# Patient Record
Sex: FEMALE | Race: WHITE | NOT HISPANIC OR LATINO | Employment: FULL TIME | URBAN - NONMETROPOLITAN AREA
[De-identification: names, ages, dates, MRNs, and addresses within clinical notes are randomized per-mention and may not be internally consistent; named-entity substitution may affect disease eponyms.]

---

## 2017-01-04 DIAGNOSIS — Z00.00 ROUTINE GENERAL MEDICAL EXAMINATION AT A HEALTH CARE FACILITY: Primary | ICD-10-CM

## 2017-01-09 LAB
ALBUMIN SERPL-MCNC: 4.3 GM/DL (ref 3.2–5.5)
ALP SERPL-CCNC: 68 U/L (ref 15–121)
ALT SERPL-CCNC: 142 U/L (ref 10–60)
ANION GAP SERPL CALCULATED.3IONS-SCNC: 10 MMOL/L (ref 5–15)
AST SERPL-CCNC: 113 U/L (ref 10–60)
BASOPHILS NFR BLD AUTO: 0.5 % (ref 0–2)
BILIRUB SERPL-MCNC: 1 MG/DL (ref 0.2–1)
BUN SERPL-MCNC: 13 MG/DL (ref 8–25)
CALCIUM SERPL-MCNC: 9.5 MG/DL (ref 8.4–10.8)
CHLORIDE SERPL-SCNC: 104 MMOL/L (ref 100–112)
CO2 SERPL-SCNC: 26 MMOL/L (ref 20–32)
CREAT SERPL-MCNC: 1 MG/DL (ref 0.4–1.3)
EOSINOPHIL NFR BLD AUTO: 1.8 % (ref 0–7)
ERYTHROCYTE [DISTWIDTH] IN BLOOD: 14.4 % (ref 11.5–14.5)
GLUCOSE SERPL-MCNC: 135 MG/DL (ref 70–100)
GRANULOCYTES NFR BLD AUTO: 56.4 % (ref 37–80)
HCT VFR BLD CALC: 38.6 % (ref 35–45)
HGB BLD-MCNC: 12.8 GM/DL (ref 12–15.5)
LYMPHOCYTES NFR BLD AUTO: 33.4 % (ref 10–50)
MCH RBC QN: 27.5 PG (ref 26–34)
MCHC RBC-ENTMCNC: 33.2 GM/DL (ref 31.4–36)
MCV RBC: 82.8 FL (ref 80–98)
MONOCYTES NFR BLD AUTO: 7.9 % (ref 0–12)
NRBC BLD AUTO-RTO: 0 %
NRBC SPEC MANUAL: 0
PLATELET # BLD: 322 X1000/MM3 (ref 150–450)
PMV BLD: 10.3 FL (ref 8–12)
POTASSIUM SERPL-SCNC: 3.9 MMOL/L (ref 3.4–5.4)
PROT SERPL-MCNC: 8 GM/DL (ref 6.7–8.2)
RBC # BLD: 4.66 MEGA/MM3 (ref 3.77–5.16)
SODIUM SERPL-SCNC: 140 MMOL/L (ref 134–146)
WBC # BLD: 8 X1000/UL (ref 3.2–9.8)

## 2017-01-10 LAB
T3FREE SERPL-MCNC: 3.1 PG/ML (ref 2–4.4)
T4 SERPL-MCNC: 14.5 UG/DL (ref 5.5–11)
TSH SERPL-ACNC: 2.52 UIU/ML (ref 0.46–4.68)

## 2017-01-11 DIAGNOSIS — R74.8 ELEVATED LIVER ENZYMES: Primary | ICD-10-CM

## 2017-09-25 ENCOUNTER — LAB (OUTPATIENT)
Dept: LAB | Facility: OTHER | Age: 22
End: 2017-09-25

## 2017-09-25 DIAGNOSIS — Z00.00 ROUTINE GENERAL MEDICAL EXAMINATION AT A HEALTH CARE FACILITY: Primary | ICD-10-CM

## 2017-09-25 DIAGNOSIS — Z00.00 ROUTINE GENERAL MEDICAL EXAMINATION AT A HEALTH CARE FACILITY: ICD-10-CM

## 2017-09-25 LAB
ALBUMIN SERPL-MCNC: 4.3 G/DL (ref 3.2–5.5)
ALBUMIN/GLOB SERPL: 1.3 G/DL (ref 1–3)
ALP SERPL-CCNC: 58 U/L (ref 15–121)
ALT SERPL W P-5'-P-CCNC: 55 U/L (ref 10–60)
ANION GAP SERPL CALCULATED.3IONS-SCNC: 10 MMOL/L (ref 5–15)
AST SERPL-CCNC: 42 U/L (ref 10–60)
BASOPHILS # BLD AUTO: 0.04 10*3/MM3 (ref 0–0.2)
BASOPHILS NFR BLD AUTO: 0.4 % (ref 0–2)
BILIRUB SERPL-MCNC: 0.5 MG/DL (ref 0.2–1)
BUN BLD-MCNC: 13 MG/DL (ref 8–25)
BUN/CREAT SERPL: 11.8 (ref 7–25)
CALCIUM SPEC-SCNC: 9.3 MG/DL (ref 8.4–10.8)
CHLORIDE SERPL-SCNC: 104 MMOL/L (ref 100–112)
CHOLEST SERPL-MCNC: 186 MG/DL (ref 150–200)
CO2 SERPL-SCNC: 27 MMOL/L (ref 20–32)
CREAT BLD-MCNC: 1.1 MG/DL (ref 0.4–1.3)
DEPRECATED RDW RBC AUTO: 41.9 FL (ref 36.4–46.3)
EOSINOPHIL # BLD AUTO: 0.18 10*3/MM3 (ref 0–0.7)
EOSINOPHIL NFR BLD AUTO: 1.8 % (ref 0–7)
ERYTHROCYTE [DISTWIDTH] IN BLOOD BY AUTOMATED COUNT: 13.9 % (ref 11.5–14.5)
GFR SERPL CREATININE-BSD FRML MDRD: 63 ML/MIN/1.73 (ref 71–165)
GLOBULIN UR ELPH-MCNC: 3.3 GM/DL (ref 2.5–4.6)
GLUCOSE BLD-MCNC: 105 MG/DL (ref 70–100)
HCT VFR BLD AUTO: 38.8 % (ref 35–45)
HDLC SERPL-MCNC: 27 MG/DL (ref 35–100)
HGB BLD-MCNC: 12.6 G/DL (ref 12–15.5)
LDLC SERPL CALC-MCNC: 96 MG/DL
LDLC/HDLC SERPL: 3.56 {RATIO}
LYMPHOCYTES # BLD AUTO: 3.88 10*3/MM3 (ref 0.6–4.2)
LYMPHOCYTES NFR BLD AUTO: 38.6 % (ref 10–50)
MCH RBC QN AUTO: 27.5 PG (ref 26.5–34)
MCHC RBC AUTO-ENTMCNC: 32.5 G/DL (ref 31.4–36)
MCV RBC AUTO: 84.5 FL (ref 80–98)
MONOCYTES # BLD AUTO: 0.99 10*3/MM3 (ref 0–0.9)
MONOCYTES NFR BLD AUTO: 9.9 % (ref 0–12)
NEUTROPHILS # BLD AUTO: 4.95 10*3/MM3 (ref 2–8.6)
NEUTROPHILS NFR BLD AUTO: 49.3 % (ref 37–80)
PLATELET # BLD AUTO: 344 10*3/MM3 (ref 150–450)
PMV BLD AUTO: 11.3 FL (ref 8–12)
POTASSIUM BLD-SCNC: 3.9 MMOL/L (ref 3.4–5.4)
PROT SERPL-MCNC: 7.6 G/DL (ref 6.7–8.2)
RBC # BLD AUTO: 4.59 10*6/MM3 (ref 3.77–5.16)
SODIUM BLD-SCNC: 141 MMOL/L (ref 134–146)
T4 FREE SERPL-MCNC: 1.3 NG/DL (ref 0.78–2.19)
TRIGL SERPL-MCNC: 314 MG/DL (ref 35–160)
TSH SERPL DL<=0.05 MIU/L-ACNC: 3.99 MIU/ML (ref 0.46–4.68)
VLDLC SERPL-MCNC: 62.8 MG/DL
WBC NRBC COR # BLD: 10.04 10*3/MM3 (ref 3.2–9.8)

## 2017-09-25 PROCEDURE — 84439 ASSAY OF FREE THYROXINE: CPT | Performed by: NURSE PRACTITIONER

## 2017-09-25 PROCEDURE — 84443 ASSAY THYROID STIM HORMONE: CPT | Performed by: NURSE PRACTITIONER

## 2017-09-25 PROCEDURE — 80061 LIPID PANEL: CPT | Performed by: NURSE PRACTITIONER

## 2017-09-25 PROCEDURE — 84481 FREE ASSAY (FT-3): CPT | Performed by: NURSE PRACTITIONER

## 2017-09-25 PROCEDURE — 36415 COLL VENOUS BLD VENIPUNCTURE: CPT | Performed by: NURSE PRACTITIONER

## 2017-09-25 PROCEDURE — 85025 COMPLETE CBC W/AUTO DIFF WBC: CPT | Performed by: NURSE PRACTITIONER

## 2017-09-25 PROCEDURE — 80053 COMPREHEN METABOLIC PANEL: CPT | Performed by: NURSE PRACTITIONER

## 2017-09-26 DIAGNOSIS — E78.2 ELEVATED TRIGLYCERIDES WITH HIGH CHOLESTEROL: Primary | ICD-10-CM

## 2017-09-26 LAB — T3FREE SERPL-MCNC: 3.1 PG/ML (ref 2–4.4)

## 2018-12-20 ENCOUNTER — LAB REQUISITION (OUTPATIENT)
Dept: LAB | Facility: OTHER | Age: 23
End: 2018-12-20

## 2018-12-20 DIAGNOSIS — Z00.00 ROUTINE GENERAL MEDICAL EXAMINATION AT A HEALTH CARE FACILITY: ICD-10-CM

## 2018-12-20 PROCEDURE — UDS COCC - COLLECTION FEE ONLY: Performed by: INTERNAL MEDICINE

## 2019-04-08 ENCOUNTER — OFFICE VISIT (OUTPATIENT)
Dept: OBSTETRICS AND GYNECOLOGY | Facility: CLINIC | Age: 24
End: 2019-04-08

## 2019-04-08 VITALS
DIASTOLIC BLOOD PRESSURE: 90 MMHG | HEIGHT: 65 IN | BODY MASS INDEX: 40.55 KG/M2 | HEART RATE: 89 BPM | WEIGHT: 243.4 LBS | SYSTOLIC BLOOD PRESSURE: 138 MMHG

## 2019-04-08 DIAGNOSIS — Z01.419 ENCOUNTER FOR GYNECOLOGICAL EXAMINATION WITH PAPANICOLAOU SMEAR OF CERVIX: Primary | ICD-10-CM

## 2019-04-08 DIAGNOSIS — N92.6 MENSTRUAL PERIODS IRREGULAR: ICD-10-CM

## 2019-04-08 DIAGNOSIS — L83 ACANTHOSIS NIGRICANS: ICD-10-CM

## 2019-04-08 DIAGNOSIS — Z01.89 ROUTINE LAB DRAW: ICD-10-CM

## 2019-04-08 LAB
ALBUMIN SERPL-MCNC: 4.3 G/DL (ref 3.5–5)
ALBUMIN/GLOB SERPL: 1.4 G/DL (ref 1.1–1.8)
ALP SERPL-CCNC: 76 U/L (ref 38–126)
ALT SERPL W P-5'-P-CCNC: 36 U/L
ANION GAP SERPL CALCULATED.3IONS-SCNC: 10 MMOL/L (ref 5–15)
AST SERPL-CCNC: 34 U/L (ref 14–36)
BASOPHILS # BLD AUTO: 0.05 10*3/MM3 (ref 0–0.2)
BASOPHILS NFR BLD AUTO: 0.6 % (ref 0–1.5)
BILIRUB SERPL-MCNC: 0.3 MG/DL (ref 0.2–1.3)
BUN BLD-MCNC: 9 MG/DL (ref 7–23)
BUN/CREAT SERPL: 9.4 (ref 7–25)
CALCIUM SPEC-SCNC: 8.8 MG/DL (ref 8.4–10.2)
CHLORIDE SERPL-SCNC: 107 MMOL/L (ref 101–112)
CHOLEST SERPL-MCNC: 164 MG/DL (ref 150–200)
CO2 SERPL-SCNC: 24 MMOL/L (ref 22–30)
CREAT BLD-MCNC: 0.96 MG/DL (ref 0.52–1.04)
DEPRECATED RDW RBC AUTO: 46.4 FL (ref 37–54)
EOSINOPHIL # BLD AUTO: 0.17 10*3/MM3 (ref 0–0.4)
EOSINOPHIL NFR BLD AUTO: 2.1 % (ref 0.3–6.2)
ERYTHROCYTE [DISTWIDTH] IN BLOOD BY AUTOMATED COUNT: 17.7 % (ref 12.3–15.4)
GFR SERPL CREATININE-BSD FRML MDRD: 72 ML/MIN/1.73 (ref 71–165)
GLOBULIN UR ELPH-MCNC: 3 GM/DL (ref 2.3–3.5)
GLUCOSE BLD-MCNC: 115 MG/DL (ref 70–99)
HCT VFR BLD AUTO: 34 % (ref 34–46.6)
HDLC SERPL-MCNC: 32 MG/DL (ref 40–59)
HGB BLD-MCNC: 10.6 G/DL (ref 12–15.9)
LDLC SERPL CALC-MCNC: 83 MG/DL
LDLC/HDLC SERPL: 2.59 {RATIO} (ref 0–3.22)
LYMPHOCYTES # BLD AUTO: 2.36 10*3/MM3 (ref 0.7–3.1)
LYMPHOCYTES NFR BLD AUTO: 29.8 % (ref 19.6–45.3)
MCH RBC QN AUTO: 23.5 PG (ref 26.6–33)
MCHC RBC AUTO-ENTMCNC: 31.2 G/DL (ref 31.5–35.7)
MCV RBC AUTO: 75.2 FL (ref 79–97)
MONOCYTES # BLD AUTO: 0.87 10*3/MM3 (ref 0.1–0.9)
MONOCYTES NFR BLD AUTO: 11 % (ref 5–12)
NEUTROPHILS # BLD AUTO: 4.46 10*3/MM3 (ref 1.4–7)
NEUTROPHILS NFR BLD AUTO: 56.5 % (ref 42.7–76)
PLATELET # BLD AUTO: 391 10*3/MM3 (ref 140–450)
PMV BLD AUTO: 10.3 FL (ref 6–12)
POTASSIUM BLD-SCNC: 4 MMOL/L (ref 3.4–5)
PROT SERPL-MCNC: 7.3 G/DL (ref 6.3–8.6)
RBC # BLD AUTO: 4.52 10*6/MM3 (ref 3.77–5.28)
SODIUM BLD-SCNC: 141 MMOL/L (ref 137–145)
TRIGL SERPL-MCNC: 246 MG/DL
TSH SERPL DL<=0.05 MIU/L-ACNC: 3.06 MIU/ML (ref 0.27–4.2)
VLDLC SERPL-MCNC: 49.2 MG/DL
WBC NRBC COR # BLD: 7.91 10*3/MM3 (ref 3.4–10.8)

## 2019-04-08 PROCEDURE — 36415 COLL VENOUS BLD VENIPUNCTURE: CPT | Performed by: NURSE PRACTITIONER

## 2019-04-08 PROCEDURE — 80053 COMPREHEN METABOLIC PANEL: CPT | Performed by: NURSE PRACTITIONER

## 2019-04-08 PROCEDURE — 88142 CYTOPATH C/V THIN LAYER: CPT | Performed by: NURSE PRACTITIONER

## 2019-04-08 PROCEDURE — 84443 ASSAY THYROID STIM HORMONE: CPT | Performed by: NURSE PRACTITIONER

## 2019-04-08 PROCEDURE — 88141 CYTOPATH C/V INTERPRET: CPT | Performed by: PATHOLOGY

## 2019-04-08 PROCEDURE — 85025 COMPLETE CBC W/AUTO DIFF WBC: CPT | Performed by: NURSE PRACTITIONER

## 2019-04-08 PROCEDURE — 83525 ASSAY OF INSULIN: CPT | Performed by: NURSE PRACTITIONER

## 2019-04-08 PROCEDURE — 80061 LIPID PANEL: CPT | Performed by: NURSE PRACTITIONER

## 2019-04-08 PROCEDURE — 99395 PREV VISIT EST AGE 18-39: CPT | Performed by: NURSE PRACTITIONER

## 2019-04-08 NOTE — PROGRESS NOTES
Subjective   Chief Complaint   Patient presents with   • Gynecologic Exam     well woman annual visit     Gayatri Mishra is a 23 y.o. year old  presenting to be seen for her annual exam.  Today she has questions about irregular periods. She states since menarche at 9-11yo, her periods have been irregular. At times skipping several months. Currently periods vary from 3 days -3weeks in length. Mostly heavy but on the longer periods it varies. No cramping. Thyroid was WNL in 2017. Hx of slightly elevated glucose and high cholesterol. Never had an exam. Did take OCPs when she was younger that helped regulate her periods some. Is not using any form of contraception but not actively trying to conceive either.      Patient's last menstrual period was 2019 (exact date).    OB History      Para Term  AB Living    0 0 0 0 0 0    SAB TAB Ectopic Molar Multiple Live Births    0 0 0 0 0 0          Current birth control method: none.    She is sexually active.  In the past 12 months there has not been new sexual partners.  Condoms are not typically used.  She would not like to be screened for STD's at today's exam. Getting  in 2019.     Past 6 month menstrual history:    Cycle Frequency: unpredictable  irregular   Menstrual cycle character: flow is typically moderately heavy and flow is typically excessive   Cycle Duration: 3 days - 3 weeks   Number of heavy days of flows: Most days heavy   Dysmenorrhea: none   PMS: is not affecting her activities of daily living   Intermenstrual bleeding present: no   Post-coital bleeding present: no     She exercises regularly: no.  She wears her seat belt:yes.  She has concerns about domestic violence: no.  Last colonoscopy: Never  Last DEXA: Never  Last MMG: Never  Last pap: Never  History of abnormal PAP: N/A    The following portions of the patient's history were reviewed and updated as appropriate:problem list, current medications,  "allergies, past family history, past medical history, past social history and past surgical history.    Social History    Tobacco Use      Smoking status: Never Smoker      Smokeless tobacco: Never Used    Social History     Substance and Sexual Activity   Alcohol Use Yes    Comment: occasional      Review of Systems   Constitutional: Negative.  Negative for chills and fever. Unexpected weight change: difficulty losing weight.   Respiratory: Negative.    Cardiovascular: Negative.    Gastrointestinal: Negative.  Negative for abdominal pain, constipation and diarrhea.   Genitourinary: Positive for menstrual problem. Negative for dyspareunia, dysuria, pelvic pain, vaginal bleeding, vaginal discharge and vaginal pain.   Skin:        Darkening skin folds, no acne or hirsutism   Neurological: Negative for syncope, light-headedness and headaches.   Psychiatric/Behavioral: Negative.          Objective   /90   Pulse 89   Ht 165.1 cm (65\")   Wt 110 kg (243 lb 6.4 oz)   LMP 03/16/2019 (Exact Date)   Breastfeeding? No   BMI 40.50 kg/m²     General:  well developed; well nourished  no acute distress  obese - Body mass index is 40.5 kg/m².   Skin:  No suspicious lesions seen  acanthosis nigricans around the base of the neck, axillas bilaterally, and in the groin bilaterally   Thyroid: normal to inspection and palpation   Breasts:  Examined in supine position  Symmetric without masses or skin dimpling  Nipples normal without inversion, lesions or discharge  There are no palpable axillary nodes   Abdomen: soft, non-tender; no masses  no umbilical or inguinal hernias are present  no hepato-splenomegaly  Normal findings: bowel sounds normal   Cardiac: Heart sounds are normal.  Regular rate and rhythm without murmur, gallop or rub.   Resp: Normal expansion.  Clear to auscultation.  No rales, rhonchi, or wheezing.   Psych: alert,oriented, in NAD with a full range of affect, normal behavior and no psychotic features "   Pelvis: Uterus:  Exam limited by  body habitus  Clinical staff was present for exam  External genitalia:  normal appearance of the external genitalia including Bartholin's and Harwick's glands.  :  urethral meatus normal;  Vaginal:  normal pink mucosa without prolapse or lesions  Cervix:  normal appearance.  Uterus:  normal size, shape and consistency  Adnexa:  non palpable bilaterally.     Lab Review   CBC, CMP, TSH and lipid from 2017    Imaging  No data reviewed       Gayatri was seen today for gynecologic exam.    Diagnoses and all orders for this visit:    Encounter for gynecological examination with Papanicolaou smear of cervix  -     Liquid-based Pap Smear, Screening  Pap results: I will send card in mail or call if abnormal. RTC annually for well woman exams    Menstrual periods irregular    Acanthosis nigricans  -     Insulin, Total    Routine lab draw  -     CBC Auto Differential  -     Comprehensive Metabolic Panel  -     TSH  -     Lipid Panel    I discussed irregular periods at length. Pt is not very bothered by them but would like to further evaluate for other health concerns like diabetes, hyperlipidemia, etc. I will order the labs listed above and call pt with results. Refer to PCP if needed, consider metformin if insulin is elevated. Consider hormonal regimens to regulate menses. Pt to think on this for now.         This note was electronically signed.    Lynda Garcia, APRN  April 8, 2019

## 2019-04-09 LAB — INSULIN SERPL-ACNC: 83.7 UIU/ML (ref 2.6–24.9)

## 2019-04-10 LAB
GEN CATEG CVX/VAG CYTO-IMP: NORMAL
LAB AP CASE REPORT: NORMAL
LAB AP GYN ADDITIONAL INFORMATION: NORMAL
LAB AP GYN OTHER FINDINGS: NORMAL
PATH INTERP SPEC-IMP: NORMAL
STAT OF ADQ CVX/VAG CYTO-IMP: NORMAL

## 2019-04-12 RX ORDER — METFORMIN HYDROCHLORIDE 500 MG/1
1000 TABLET, EXTENDED RELEASE ORAL
Qty: 60 TABLET | Refills: 5 | Status: SHIPPED | OUTPATIENT
Start: 2019-04-12 | End: 2021-02-19

## 2019-04-12 NOTE — PROGRESS NOTES
Glucose 115, Insulin 83.7. Begin Metformin. GI side effects discussed. Pt to RTC in 6 months for follow up and repeat labs. Diet and exercise encouraged. Consider seeing PCP for further evaluation of lipids.

## 2019-07-31 ENCOUNTER — OFFICE VISIT (OUTPATIENT)
Dept: OBSTETRICS AND GYNECOLOGY | Facility: CLINIC | Age: 24
End: 2019-07-31

## 2019-07-31 VITALS
SYSTOLIC BLOOD PRESSURE: 140 MMHG | BODY MASS INDEX: 41.82 KG/M2 | HEIGHT: 65 IN | WEIGHT: 251 LBS | HEART RATE: 96 BPM | DIASTOLIC BLOOD PRESSURE: 88 MMHG

## 2019-07-31 DIAGNOSIS — N92.1 EXCESSIVE AND FREQUENT MENSTRUATION WITH IRREGULAR CYCLE: Primary | ICD-10-CM

## 2019-07-31 LAB
BASOPHILS # BLD AUTO: 0.05 10*3/MM3 (ref 0–0.2)
BASOPHILS NFR BLD AUTO: 0.5 % (ref 0–1.5)
DEPRECATED RDW RBC AUTO: 48.2 FL (ref 37–54)
EOSINOPHIL # BLD AUTO: 0.15 10*3/MM3 (ref 0–0.4)
EOSINOPHIL NFR BLD AUTO: 1.4 % (ref 0.3–6.2)
ERYTHROCYTE [DISTWIDTH] IN BLOOD BY AUTOMATED COUNT: 17.8 % (ref 12.3–15.4)
HCT VFR BLD AUTO: 30 % (ref 34–46.6)
HGB BLD-MCNC: 9.4 G/DL (ref 12–15.9)
LYMPHOCYTES # BLD AUTO: 3.23 10*3/MM3 (ref 0.7–3.1)
LYMPHOCYTES NFR BLD AUTO: 30.3 % (ref 19.6–45.3)
MCH RBC QN AUTO: 24.3 PG (ref 26.6–33)
MCHC RBC AUTO-ENTMCNC: 31.3 G/DL (ref 31.5–35.7)
MCV RBC AUTO: 77.5 FL (ref 79–97)
MONOCYTES # BLD AUTO: 1.17 10*3/MM3 (ref 0.1–0.9)
MONOCYTES NFR BLD AUTO: 11 % (ref 5–12)
NEUTROPHILS # BLD AUTO: 6.07 10*3/MM3 (ref 1.7–7)
NEUTROPHILS NFR BLD AUTO: 56.8 % (ref 42.7–76)
PLATELET # BLD AUTO: 417 10*3/MM3 (ref 140–450)
PMV BLD AUTO: 9.9 FL (ref 6–12)
RBC # BLD AUTO: 3.87 10*6/MM3 (ref 3.77–5.28)
WBC NRBC COR # BLD: 10.67 10*3/MM3 (ref 3.4–10.8)

## 2019-07-31 PROCEDURE — 36415 COLL VENOUS BLD VENIPUNCTURE: CPT | Performed by: NURSE PRACTITIONER

## 2019-07-31 PROCEDURE — 99214 OFFICE O/P EST MOD 30 MIN: CPT | Performed by: NURSE PRACTITIONER

## 2019-07-31 PROCEDURE — 85025 COMPLETE CBC W/AUTO DIFF WBC: CPT | Performed by: NURSE PRACTITIONER

## 2019-08-02 NOTE — PROGRESS NOTES
Subjective   Gayatri Mishra is a 23 y.o. female.     History of Present Illness   Pt presents with concerns of a prolonged period. When I saw pt in April she mentioned her irregular periods that could last up to 3 weeks but didn't want to take anything for them. She was trying to conceive but isn't preventing either. Pt states in the last 3 months she has only stopped bleeding for approximately 7 days at a time. This current bleeding has been going on for 3 weeks. In the last few days, having to change a pad every hour. She is already taking 325mg Iron tablets once daily and metformin as prescribed without any GI upset. Pt denies fatigue, dizziness, lightheadedness, palpitations or feeling cold.     Pt gets  on August 24th and really wants bleeding under control by then. She is willing to do OCPs for now to get her through the wedding.     The following portions of the patient's history were reviewed and updated as appropriate: allergies, current medications, past family history, past medical history, past social history, past surgical history and problem list.    Review of Systems   Constitutional: Negative.  Negative for chills, fatigue and fever.   Respiratory: Negative.  Negative for shortness of breath.    Cardiovascular: Negative.  Negative for palpitations.   Gastrointestinal: Negative for abdominal pain, constipation, diarrhea, nausea and vomiting.   Endocrine: Negative for cold intolerance.   Genitourinary: Positive for menstrual problem (excessive bleeding, prolonged). Negative for pelvic pain.   Skin: Negative for pallor.   Neurological: Negative for dizziness, syncope, weakness and light-headedness.       Objective    Vitals:    07/31/19 1328   BP: 140/88   Pulse: 96         07/31/19  1328   Weight: 114 kg (251 lb)     Body mass index is 41.77 kg/m².    Physical Exam   Constitutional: She is oriented to person, place, and time. She appears well-developed and well-nourished. She is  morbidly obese.  Cardiovascular: Normal rate, regular rhythm and normal heart sounds.   Pulmonary/Chest: Effort normal and breath sounds normal.   Neurological: She is alert and oriented to person, place, and time.   Skin: Skin is warm and dry. No pallor.   Vitals reviewed.    WBC   Date Value Ref Range Status   07/31/2019 10.67 3.40 - 10.80 10*3/mm3 Final     RBC   Date Value Ref Range Status   07/31/2019 3.87 3.77 - 5.28 10*6/mm3 Final     Hemoglobin   Date Value Ref Range Status   07/31/2019 9.4 (L) 12.0 - 15.9 g/dL Final     Hematocrit   Date Value Ref Range Status   07/31/2019 30.0 (L) 34.0 - 46.6 % Final     MCV   Date Value Ref Range Status   07/31/2019 77.5 (L) 79.0 - 97.0 fL Final     MCH   Date Value Ref Range Status   07/31/2019 24.3 (L) 26.6 - 33.0 pg Final     MCHC   Date Value Ref Range Status   07/31/2019 31.3 (L) 31.5 - 35.7 g/dL Final     RDW   Date Value Ref Range Status   07/31/2019 17.8 (H) 12.3 - 15.4 % Final     RDW-SD   Date Value Ref Range Status   07/31/2019 48.2 37.0 - 54.0 fl Final     MPV   Date Value Ref Range Status   07/31/2019 9.9 6.0 - 12.0 fL Final     Platelets   Date Value Ref Range Status   07/31/2019 417 140 - 450 10*3/mm3 Final     Neutrophil %   Date Value Ref Range Status   07/31/2019 56.8 42.7 - 76.0 % Final     Lymphocyte %   Date Value Ref Range Status   07/31/2019 30.3 19.6 - 45.3 % Final     Monocyte %   Date Value Ref Range Status   07/31/2019 11.0 5.0 - 12.0 % Final     Eosinophil %   Date Value Ref Range Status   07/31/2019 1.4 0.3 - 6.2 % Final     Basophil %   Date Value Ref Range Status   07/31/2019 0.5 0.0 - 1.5 % Final     Immature Granulocyte Rel %   Date Value Ref Range Status   08/12/2015 0.40 0.00 - 0.50 % Final     Neutrophils, Absolute   Date Value Ref Range Status   07/31/2019 6.07 1.70 - 7.00 10*3/mm3 Final     Lymphocytes, Absolute   Date Value Ref Range Status   07/31/2019 3.23 (H) 0.70 - 3.10 10*3/mm3 Final     Monocytes, Absolute   Date Value Ref  Range Status   07/31/2019 1.17 (H) 0.10 - 0.90 10*3/mm3 Final     Eosinophils, Absolute   Date Value Ref Range Status   07/31/2019 0.15 0.00 - 0.40 10*3/mm3 Final     Basophils, Absolute   Date Value Ref Range Status   07/31/2019 0.05 0.00 - 0.20 10*3/mm3 Final     Immature Granulocytes Absolute   Date Value Ref Range Status   08/12/2015 0.030 (H) 0.005 - 0.022 x1000/uL Final     nRBC   Date Value Ref Range Status   01/09/2017 0  Final   01/09/2017 0  Final         Assessment/Plan   Gayatri was seen today for menometrorrhagia.    Diagnoses and all orders for this visit:    Excessive and frequent menstruation with irregular cycle  -     norethindrone-ethinyl estradiol (NORTREL 1/35, 21,) 1-35 MG-MCG per tablet; Take 1 tablet by mouth Daily.  -     CBC Auto Differential      CBC indicated anemia secondary to excessive blood loss during menstruation. Instructed pt to take 65mg elemental iron BID. Warned of GI side effects. Start Nortrel 1/35. Take 2 tablets PO x 4 days. Then reduce to once daily if bleeding has improved. Take one tablet daily to complete the pack, take a 4 day holiday and resume the next pack. This should have pt back on hormone pills 3-4 days before her wedding, in hopes of preventing bleeding. I will call pt on Friday to check in to make sure bleeding is slowing down. Warned of S/S of excessive blood loss, if saturating a pad an hour for several hours in a row and feeling fatigued, dizzy, syncope, etc, go to ER. Pt voices understanding. Otherwise RTC after the wedding to discuss long term management.

## 2019-09-17 ENCOUNTER — LAB REQUISITION (OUTPATIENT)
Dept: LAB | Facility: OTHER | Age: 24
End: 2019-09-17

## 2019-09-17 DIAGNOSIS — Z00.00 ROUTINE GENERAL MEDICAL EXAMINATION AT A HEALTH CARE FACILITY: ICD-10-CM

## 2019-09-17 LAB — Lab: 1

## 2019-09-17 PROCEDURE — 82075 ASSAY OF BREATH ETHANOL: CPT | Performed by: INTERNAL MEDICINE

## 2019-09-17 PROCEDURE — UDS COCC - COLLECTION FEE ONLY: Performed by: INTERNAL MEDICINE

## 2019-10-02 ENCOUNTER — LAB (OUTPATIENT)
Dept: LAB | Facility: OTHER | Age: 24
End: 2019-10-02

## 2019-10-02 DIAGNOSIS — N92.1 BREAKTHROUGH BLEEDING ON BIRTH CONTROL PILLS: ICD-10-CM

## 2019-10-02 DIAGNOSIS — D50.0 IRON DEFICIENCY ANEMIA DUE TO CHRONIC BLOOD LOSS: ICD-10-CM

## 2019-10-02 DIAGNOSIS — N92.1 BREAKTHROUGH BLEEDING ON BIRTH CONTROL PILLS: Primary | ICD-10-CM

## 2019-10-02 LAB
ANISOCYTOSIS BLD QL: ABNORMAL
BASOPHILS # BLD MANUAL: 0.19 10*3/MM3 (ref 0–0.2)
BASOPHILS NFR BLD AUTO: 2 % (ref 0–1.5)
DEPRECATED RDW RBC AUTO: 44.8 FL (ref 37–54)
ELLIPTOCYTES BLD QL SMEAR: ABNORMAL
EOSINOPHIL # BLD MANUAL: 0.29 10*3/MM3 (ref 0–0.4)
EOSINOPHIL NFR BLD MANUAL: 3 % (ref 0.3–6.2)
ERYTHROCYTE [DISTWIDTH] IN BLOOD BY AUTOMATED COUNT: 18.4 % (ref 12.3–15.4)
HCT VFR BLD AUTO: 24.7 % (ref 34–46.6)
HGB BLD-MCNC: 7.1 G/DL (ref 12–15.9)
HYPOCHROMIA BLD QL: ABNORMAL
LARGE PLATELETS: ABNORMAL
LYMPHOCYTES # BLD MANUAL: 3.52 10*3/MM3 (ref 0.7–3.1)
LYMPHOCYTES NFR BLD MANUAL: 37 % (ref 19.6–45.3)
LYMPHOCYTES NFR BLD MANUAL: 4 % (ref 5–12)
MCH RBC QN AUTO: 19.8 PG (ref 26.6–33)
MCHC RBC AUTO-ENTMCNC: 28.7 G/DL (ref 31.5–35.7)
MCV RBC AUTO: 68.8 FL (ref 79–97)
MICROCYTES BLD QL: ABNORMAL
MONOCYTES # BLD AUTO: 0.38 10*3/MM3 (ref 0.1–0.9)
NEUTROPHILS # BLD AUTO: 5.13 10*3/MM3 (ref 1.7–7)
NEUTROPHILS NFR BLD MANUAL: 54 % (ref 42.7–76)
PLATELET # BLD AUTO: 518 10*3/MM3 (ref 140–450)
PMV BLD AUTO: 10.1 FL (ref 6–12)
POLYCHROMASIA BLD QL SMEAR: ABNORMAL
RBC # BLD AUTO: 3.59 10*6/MM3 (ref 3.77–5.28)
SMALL PLATELETS BLD QL SMEAR: ABNORMAL
STOMATOCYTES BLD QL SMEAR: ABNORMAL
WBC MORPH BLD: NORMAL
WBC NRBC COR # BLD: 9.5 10*3/MM3 (ref 3.4–10.8)

## 2019-10-02 PROCEDURE — 36415 COLL VENOUS BLD VENIPUNCTURE: CPT | Performed by: NURSE PRACTITIONER

## 2019-10-02 PROCEDURE — 84466 ASSAY OF TRANSFERRIN: CPT | Performed by: NURSE PRACTITIONER

## 2019-10-02 PROCEDURE — 85025 COMPLETE CBC W/AUTO DIFF WBC: CPT | Performed by: NURSE PRACTITIONER

## 2019-10-02 PROCEDURE — 83540 ASSAY OF IRON: CPT | Performed by: NURSE PRACTITIONER

## 2019-10-02 NOTE — PROGRESS NOTES
Patient called and said she has been continuously bleeding this month on her OCP. She said Lynda had prescribed her a stronger one and it was helping the first couple of months. Lynda wants her to come in and have cbc and iron profile. Will call patient with results.

## 2019-10-03 LAB
IRON 24H UR-MRATE: 10 MCG/DL (ref 37–145)
IRON SATN MFR SERPL: 2 % (ref 20–50)
TIBC SERPL-MCNC: 541 MCG/DL (ref 298–536)
TRANSFERRIN SERPL-MCNC: 363 MG/DL (ref 200–360)

## 2019-10-04 ENCOUNTER — OFFICE VISIT (OUTPATIENT)
Dept: OBSTETRICS AND GYNECOLOGY | Facility: CLINIC | Age: 24
End: 2019-10-04

## 2019-10-04 VITALS
WEIGHT: 240.6 LBS | BODY MASS INDEX: 40.08 KG/M2 | HEART RATE: 95 BPM | HEIGHT: 65 IN | DIASTOLIC BLOOD PRESSURE: 86 MMHG | SYSTOLIC BLOOD PRESSURE: 140 MMHG

## 2019-10-04 DIAGNOSIS — E28.2 PCOS (POLYCYSTIC OVARIAN SYNDROME): ICD-10-CM

## 2019-10-04 DIAGNOSIS — D50.0 IRON DEFICIENCY ANEMIA DUE TO CHRONIC BLOOD LOSS: ICD-10-CM

## 2019-10-04 DIAGNOSIS — N92.1 BREAKTHROUGH BLEEDING ON BIRTH CONTROL PILLS: Primary | ICD-10-CM

## 2019-10-04 PROCEDURE — 99214 OFFICE O/P EST MOD 30 MIN: CPT | Performed by: NURSE PRACTITIONER

## 2019-10-29 ENCOUNTER — LAB (OUTPATIENT)
Dept: LAB | Facility: OTHER | Age: 24
End: 2019-10-29

## 2019-10-29 DIAGNOSIS — D50.0 IRON DEFICIENCY ANEMIA DUE TO CHRONIC BLOOD LOSS: ICD-10-CM

## 2019-10-29 DIAGNOSIS — N92.1 BREAKTHROUGH BLEEDING ON BIRTH CONTROL PILLS: ICD-10-CM

## 2019-10-29 DIAGNOSIS — D50.9 IRON DEFICIENCY ANEMIA, UNSPECIFIED IRON DEFICIENCY ANEMIA TYPE: Primary | ICD-10-CM

## 2019-10-29 LAB
ANISOCYTOSIS BLD QL: ABNORMAL
DEPRECATED RDW RBC AUTO: 44.4 FL (ref 37–54)
EOSINOPHIL # BLD MANUAL: 0.11 10*3/MM3 (ref 0–0.4)
EOSINOPHIL NFR BLD MANUAL: 1 % (ref 0.3–6.2)
ERYTHROCYTE [DISTWIDTH] IN BLOOD BY AUTOMATED COUNT: 19.6 % (ref 12.3–15.4)
HCT VFR BLD AUTO: 24.3 % (ref 34–46.6)
HGB BLD-MCNC: 6.7 G/DL (ref 12–15.9)
HYPOCHROMIA BLD QL: ABNORMAL
LYMPHOCYTES # BLD MANUAL: 1.67 10*3/MM3 (ref 0.7–3.1)
LYMPHOCYTES NFR BLD MANUAL: 15 % (ref 19.6–45.3)
LYMPHOCYTES NFR BLD MANUAL: 6 % (ref 5–12)
MCH RBC QN AUTO: 17.7 PG (ref 26.6–33)
MCHC RBC AUTO-ENTMCNC: 27.6 G/DL (ref 31.5–35.7)
MCV RBC AUTO: 64.3 FL (ref 79–97)
MICROCYTES BLD QL: ABNORMAL
MONOCYTES # BLD AUTO: 0.67 10*3/MM3 (ref 0.1–0.9)
NEUTROPHILS # BLD AUTO: 8.68 10*3/MM3 (ref 1.7–7)
NEUTROPHILS NFR BLD MANUAL: 78 % (ref 42.7–76)
PLATELET # BLD AUTO: 521 10*3/MM3 (ref 140–450)
PMV BLD AUTO: 10.1 FL (ref 6–12)
POLYCHROMASIA BLD QL SMEAR: ABNORMAL
RBC # BLD AUTO: 3.78 10*6/MM3 (ref 3.77–5.28)
SCAN SLIDE: NORMAL
SMALL PLATELETS BLD QL SMEAR: ABNORMAL
WBC MORPH BLD: NORMAL
WBC NRBC COR # BLD: 11.13 10*3/MM3 (ref 3.4–10.8)

## 2019-10-29 PROCEDURE — 36415 COLL VENOUS BLD VENIPUNCTURE: CPT | Performed by: NURSE PRACTITIONER

## 2019-10-29 PROCEDURE — 83540 ASSAY OF IRON: CPT | Performed by: NURSE PRACTITIONER

## 2019-10-29 PROCEDURE — 84466 ASSAY OF TRANSFERRIN: CPT | Performed by: NURSE PRACTITIONER

## 2019-10-29 NOTE — PROGRESS NOTES
WBC   Date Value Ref Range Status   10/04/2019 11.13 (H) 3.40 - 10.80 10*3/mm3 Final     RBC   Date Value Ref Range Status   10/04/2019 3.78 3.77 - 5.28 10*6/mm3 Final     Hemoglobin   Date Value Ref Range Status   10/04/2019 6.7 (C) 12.0 - 15.9 g/dL Final     Hematocrit   Date Value Ref Range Status   10/04/2019 24.3 (L) 34.0 - 46.6 % Final     MCV   Date Value Ref Range Status   10/04/2019 64.3 (L) 79.0 - 97.0 fL Final     MCH   Date Value Ref Range Status   10/04/2019 17.7 (L) 26.6 - 33.0 pg Final     MCHC   Date Value Ref Range Status   10/04/2019 27.6 (L) 31.5 - 35.7 g/dL Final     RDW   Date Value Ref Range Status   10/04/2019 19.6 (H) 12.3 - 15.4 % Final     RDW-SD   Date Value Ref Range Status   10/04/2019 44.4 37.0 - 54.0 fl Final     MPV   Date Value Ref Range Status   10/04/2019 10.1 6.0 - 12.0 fL Final     Platelets   Date Value Ref Range Status   10/04/2019 521 (H) 140 - 450 10*3/mm3 Final     Neutrophil %   Date Value Ref Range Status   07/31/2019 56.8 42.7 - 76.0 % Final     Lymphocyte %   Date Value Ref Range Status   07/31/2019 30.3 19.6 - 45.3 % Final     Monocyte %   Date Value Ref Range Status   07/31/2019 11.0 5.0 - 12.0 % Final     Eosinophil %   Date Value Ref Range Status   07/31/2019 1.4 0.3 - 6.2 % Final     Basophil %   Date Value Ref Range Status   07/31/2019 0.5 0.0 - 1.5 % Final     Immature Granulocyte Rel %   Date Value Ref Range Status   08/12/2015 0.40 0.00 - 0.50 % Final     Neutrophils Absolute   Date Value Ref Range Status   10/04/2019 8.68 (H) 1.70 - 7.00 10*3/mm3 Final     Neutrophils, Absolute   Date Value Ref Range Status   07/31/2019 6.07 1.70 - 7.00 10*3/mm3 Final     Lymphocytes, Absolute   Date Value Ref Range Status   07/31/2019 3.23 (H) 0.70 - 3.10 10*3/mm3 Final     Monocytes, Absolute   Date Value Ref Range Status   07/31/2019 1.17 (H) 0.10 - 0.90 10*3/mm3 Final     Eosinophils Absolute   Date Value Ref Range Status   10/04/2019 0.11 0.00 - 0.40 10*3/mm3 Final      Eosinophils, Absolute   Date Value Ref Range Status   07/31/2019 0.15 0.00 - 0.40 10*3/mm3 Final     Basophils Absolute   Date Value Ref Range Status   10/02/2019 0.19 0.00 - 0.20 10*3/mm3 Final     Basophils, Absolute   Date Value Ref Range Status   07/31/2019 0.05 0.00 - 0.20 10*3/mm3 Final     Immature Granulocytes Absolute   Date Value Ref Range Status   08/12/2015 0.030 (H) 0.005 - 0.022 x1000/uL Final     nRBC   Date Value Ref Range Status   01/09/2017 0  Final   01/09/2017 0  Final       Referral to Hematology placed. Pt sent to [facility] center for blood transfusion of 3 unit PRBC

## 2019-10-30 ENCOUNTER — INFUSION (OUTPATIENT)
Dept: PEDIATRICS | Facility: HOSPITAL | Age: 24
End: 2019-10-30

## 2019-10-30 VITALS
DIASTOLIC BLOOD PRESSURE: 98 MMHG | SYSTOLIC BLOOD PRESSURE: 148 MMHG | OXYGEN SATURATION: 98 % | RESPIRATION RATE: 18 BRPM | TEMPERATURE: 98.4 F | HEART RATE: 86 BPM

## 2019-10-30 DIAGNOSIS — D64.89 ANEMIA DUE TO OTHER CAUSE, NOT CLASSIFIED: Primary | ICD-10-CM

## 2019-10-30 DIAGNOSIS — D64.9 ANEMIA, UNSPECIFIED TYPE: ICD-10-CM

## 2019-10-30 DIAGNOSIS — D64.89 ANEMIA DUE TO OTHER CAUSE, NOT CLASSIFIED: ICD-10-CM

## 2019-10-30 DIAGNOSIS — D64.9 ANEMIA, UNSPECIFIED TYPE: Primary | ICD-10-CM

## 2019-10-30 LAB
ABO GROUP BLD: NORMAL
ABO GROUP BLD: NORMAL
BLD GP AB SCN SERPL QL: NEGATIVE
IRON 24H UR-MRATE: 15 MCG/DL (ref 37–145)
IRON SATN MFR SERPL: 3 % (ref 20–50)
RH BLD: POSITIVE
RH BLD: POSITIVE
T&S EXPIRATION DATE: NORMAL
TIBC SERPL-MCNC: 459 MCG/DL (ref 298–536)
TRANSFERRIN SERPL-MCNC: 308 MG/DL (ref 200–360)

## 2019-10-30 PROCEDURE — 86850 RBC ANTIBODY SCREEN: CPT | Performed by: NURSE PRACTITIONER

## 2019-10-30 PROCEDURE — 86901 BLOOD TYPING SEROLOGIC RH(D): CPT | Performed by: NURSE PRACTITIONER

## 2019-10-30 PROCEDURE — 36430 TRANSFUSION BLD/BLD COMPNT: CPT

## 2019-10-30 PROCEDURE — 86900 BLOOD TYPING SEROLOGIC ABO: CPT | Performed by: NURSE PRACTITIONER

## 2019-10-30 PROCEDURE — 86923 COMPATIBILITY TEST ELECTRIC: CPT

## 2019-10-30 PROCEDURE — P9016 RBC LEUKOCYTES REDUCED: HCPCS

## 2019-10-30 PROCEDURE — 86900 BLOOD TYPING SEROLOGIC ABO: CPT

## 2019-10-30 PROCEDURE — 86901 BLOOD TYPING SEROLOGIC RH(D): CPT

## 2019-10-30 RX ORDER — SODIUM CHLORIDE 9 MG/ML
250 INJECTION, SOLUTION INTRAVENOUS AS NEEDED
Status: DISCONTINUED | OUTPATIENT
Start: 2019-10-30 | End: 2019-10-30 | Stop reason: HOSPADM

## 2019-10-30 RX ORDER — SODIUM CHLORIDE 9 MG/ML
250 INJECTION, SOLUTION INTRAVENOUS AS NEEDED
Status: CANCELLED | OUTPATIENT
Start: 2019-10-30

## 2019-10-31 LAB
ABO + RH BLD: NORMAL
BH BB BLOOD EXPIRATION DATE: NORMAL
BH BB BLOOD TYPE BARCODE: 5100
BH BB DISPENSE STATUS: NORMAL
BH BB PRODUCT CODE: NORMAL
BH BB UNIT NUMBER: NORMAL
UNIT  ABO: NORMAL
UNIT  RH: NORMAL

## 2019-11-03 NOTE — PROGRESS NOTES
DATE OF CONSULT: 11/4/2019      REQUESTING SOURCE:  Lynda Garcia APRN      REASON FOR CONSULTATION: Severe iron deficiency anemia, thrombocytosis, leukocytosis      HISTORY OF PRESENT ILLNESS:    23-year-old female with medical problem consisting of obesity, heavy menstrual bleeding since her menarche which got worse around August 2019.  Patient was found to have progressive anemia with severe iron deficiency for which she has been started on ferrous sulfate 1 tablet by mouth twice daily at present.  Due to severe anemia with a hemoglobin of 6.7, patient received 3 units of PRBC on October 29, 2019.  Patient was also found to have thrombocytosis and leukocytosis on recent blood work.  Patient has been referred to [facility] for further evaluation and recommendation regarding abnormal CBC and severe iron deficiency anemia.  Patient states since starting on birth control pills, her menstrual cycle has stopped.  States her fatigue is improved with recent blood transfusion.  Complains of intermittent shortness of breath with exertion.  Denies any chest pain or palpitation.  Denies any tingling or numbness affecting upper or lower extremity.        PAST MEDICAL HISTORY:    Past Medical History:   Diagnosis Date   • Hypertension    • Obesity        PAST SURGICAL HISTORY:  Past Surgical History:   Procedure Laterality Date   • ADENOIDECTOMY     • TONSILLECTOMY         ALLERGIES:    No Known Allergies    SOCIAL HISTORY:   Social History     Tobacco Use   • Smoking status: Never Smoker   • Smokeless tobacco: Never Used   Substance Use Topics   • Alcohol use: Yes     Comment: occasional   • Drug use: No       CURRENT MEDICATIONS:    Current Outpatient Medications   Medication Sig Dispense Refill   • metFORMIN ER (GLUCOPHAGE-XR) 500 MG 24 hr tablet Take 2 tablets by mouth Daily With Breakfast. 60 tablet 5   • norethindrone-ethinyl estradiol (NORTREL 1/35, 21,) 1-35 MG-MCG per tablet Take 1 tablet by  "mouth Daily. 21 tablet 5     No current facility-administered medications for this visit.         HOME MEDICATIONS:   Current Outpatient Medications on File Prior to Visit   Medication Sig Dispense Refill   • metFORMIN ER (GLUCOPHAGE-XR) 500 MG 24 hr tablet Take 2 tablets by mouth Daily With Breakfast. 60 tablet 5   • norethindrone-ethinyl estradiol (NORTREL 1/35, 21,) 1-35 MG-MCG per tablet Take 1 tablet by mouth Daily. 21 tablet 5     No current facility-administered medications on file prior to visit.        FAMILY HISTORY:    Family History   Problem Relation Age of Onset   • No Known Problems Mother    • No Known Problems Father    • Stroke Paternal Grandmother            REVIEW OF SYSTEMS:      CONSTITUTIONAL:  Complains of fatigue. Denies any fever, chills or weight loss.     EYES: No visual disturbances. No discharge. No new lesions    ENMT:  No epistaxis, mouth sores or difficulty swallowing.    RESPIRATORY: Complains of intermittent shortness of breath with exertion.  No new cough or hemoptysis.    CARDIOVASCULAR:  No chest pain or palpitations.    GASTROINTESTINAL:  No abdominal pain nausea, vomiting or blood in the stool.    GENITOURINARY: No Dysuria or Hematuria.    MUSCULOSKELETAL:  No new back pain or arthralgia.    LYMPHATICS:  Denies any abnormal swollen glands anywhere in the body.    NEUROLOGICAL : No tingling or numbness. No headache or dizziness. No seizures or balance problems.    SKIN: No new skin lesions.    ENDOCRINE : No new heat or cold intolerance. No new polyuria . No polydipsia.        PHYSICAL EXAMINATION:      VITAL SIGNS:  /92 Comment: MANAULLY  Pulse 97   Temp 97.7 °F (36.5 °C)   Ht 165.1 cm (65\")   Wt 111 kg (244 lb)   LMP 09/29/2019 (Within Days)   BMI 40.60 kg/m²       11/04/19  1534   Weight: 111 kg (244 lb)       ECOG performance status: 1    CONSTITUTIONAL:  Not in any distress.    EYES: Mild conjunctival Pallor. No Icterus. No Pterygium. Extraocular Movements " intact.No ptosis.    ENMT:  Normocephalic, Atraumatic.No Facial Asymmetry noted.    NECK:  No adenopathy.Trachea midline. NO JVD.    RESPIRATORY:  Fair air entry bilateral. No rhonchi or wheezing.Fair respiratory effort.    CARDIOVASCULAR:  S1, S2. Regular rate and rhythm. No murmur or gallop appreciated.    ABDOMEN:  Soft, obese, nontender. Bowel sounds present in all four quadrants.  No Hepatosplenomegaly appreciated.    MUSCULOSKELETAL:  No edema.No Calf Tenderness.Normal range of motion.    NEUROLOGIC:    No  Motor or sensory deficit appreciated. Cranial Nerves 2-12 grossly intact.    SKIN : No new skin lesion identified. Skin is warm and dry to touch.    LYMPHATICS: No new enlarged lymph nodes in neck or supraclavicular area.    PSYCHIATRY: Alert, awake and oriented ×3.Normal affect.  Normal judgment.  Makes good eye contact.          DIAGNOSTIC DATA:    WBC   Date Value Ref Range Status   10/04/2019 11.13 (H) 3.40 - 10.80 10*3/mm3 Final     RBC   Date Value Ref Range Status   10/04/2019 3.78 3.77 - 5.28 10*6/mm3 Final     Hemoglobin   Date Value Ref Range Status   10/04/2019 6.7 (C) 12.0 - 15.9 g/dL Final     Hematocrit   Date Value Ref Range Status   10/04/2019 24.3 (L) 34.0 - 46.6 % Final     MCV   Date Value Ref Range Status   10/04/2019 64.3 (L) 79.0 - 97.0 fL Final     MCH   Date Value Ref Range Status   10/04/2019 17.7 (L) 26.6 - 33.0 pg Final     MCHC   Date Value Ref Range Status   10/04/2019 27.6 (L) 31.5 - 35.7 g/dL Final     RDW   Date Value Ref Range Status   10/04/2019 19.6 (H) 12.3 - 15.4 % Final     RDW-SD   Date Value Ref Range Status   10/04/2019 44.4 37.0 - 54.0 fl Final     MPV   Date Value Ref Range Status   10/04/2019 10.1 6.0 - 12.0 fL Final     Platelets   Date Value Ref Range Status   10/04/2019 521 (H) 140 - 450 10*3/mm3 Final     Neutrophil %   Date Value Ref Range Status   07/31/2019 56.8 42.7 - 76.0 % Final     Lymphocyte %   Date Value Ref Range Status   07/31/2019 30.3 19.6 - 45.3  % Final     Monocyte %   Date Value Ref Range Status   07/31/2019 11.0 5.0 - 12.0 % Final     Eosinophil %   Date Value Ref Range Status   07/31/2019 1.4 0.3 - 6.2 % Final     Basophil %   Date Value Ref Range Status   07/31/2019 0.5 0.0 - 1.5 % Final     Immature Granulocyte Rel %   Date Value Ref Range Status   08/12/2015 0.40 0.00 - 0.50 % Final     Neutrophils Absolute   Date Value Ref Range Status   10/04/2019 8.68 (H) 1.70 - 7.00 10*3/mm3 Final     Neutrophils, Absolute   Date Value Ref Range Status   07/31/2019 6.07 1.70 - 7.00 10*3/mm3 Final     Lymphocytes, Absolute   Date Value Ref Range Status   07/31/2019 3.23 (H) 0.70 - 3.10 10*3/mm3 Final     Monocytes, Absolute   Date Value Ref Range Status   07/31/2019 1.17 (H) 0.10 - 0.90 10*3/mm3 Final     Eosinophils Absolute   Date Value Ref Range Status   10/04/2019 0.11 0.00 - 0.40 10*3/mm3 Final     Eosinophils, Absolute   Date Value Ref Range Status   07/31/2019 0.15 0.00 - 0.40 10*3/mm3 Final     Basophils Absolute   Date Value Ref Range Status   10/02/2019 0.19 0.00 - 0.20 10*3/mm3 Final     Basophils, Absolute   Date Value Ref Range Status   07/31/2019 0.05 0.00 - 0.20 10*3/mm3 Final     Immature Granulocytes Absolute   Date Value Ref Range Status   08/12/2015 0.030 (H) 0.005 - 0.022 x1000/uL Final     nRBC   Date Value Ref Range Status   01/09/2017 0  Final   01/09/2017 0  Final     Glucose   Date Value Ref Range Status   04/08/2019 115 (H) 70 - 99 mg/dL Final     Sodium   Date Value Ref Range Status   04/08/2019 141 137 - 145 mmol/L Final     Potassium   Date Value Ref Range Status   04/08/2019 4.0 3.4 - 5.0 mmol/L Final     CO2   Date Value Ref Range Status   04/08/2019 24.0 22.0 - 30.0 mmol/L Final     Chloride   Date Value Ref Range Status   04/08/2019 107 101 - 112 mmol/L Final     Anion Gap   Date Value Ref Range Status   04/08/2019 10.0 5.0 - 15.0 mmol/L Final     Creatinine   Date Value Ref Range Status   04/08/2019 0.96 0.52 - 1.04 mg/dL Final      BUN   Date Value Ref Range Status   04/08/2019 9 7 - 23 mg/dL Final     BUN/Creatinine Ratio   Date Value Ref Range Status   04/08/2019 9.4 7.0 - 25.0 Final     Calcium   Date Value Ref Range Status   04/08/2019 8.8 8.4 - 10.2 mg/dL Final     eGFR Non  Amer   Date Value Ref Range Status   04/08/2019 72 71 - 165 mL/min/1.73 Final     Alkaline Phosphatase   Date Value Ref Range Status   04/08/2019 76 38 - 126 U/L Final     Total Protein   Date Value Ref Range Status   04/08/2019 7.3 6.3 - 8.6 g/dL Final     ALT (SGPT)   Date Value Ref Range Status   04/08/2019 36 (H) <=35 U/L Final     AST (SGOT)   Date Value Ref Range Status   04/08/2019 34 14 - 36 U/L Final     Total Bilirubin   Date Value Ref Range Status   04/08/2019 0.3 0.2 - 1.3 mg/dL Final     Albumin   Date Value Ref Range Status   04/08/2019 4.30 3.50 - 5.00 g/dL Final     Globulin   Date Value Ref Range Status   04/08/2019 3.0 2.3 - 3.5 gm/dL Final     Lab Results   Component Value Date    IRON 15 (L) 10/29/2019    TIBC 459 10/29/2019    LABIRON 3 (L) 10/29/2019     No results found for: , LABCA2, AFPTM, HCGQUANT, , CHROMGRNA, [id number], CEA, REFLABREPO]        Radiology Data :  Transvaginal ultrasound done on October 15, 2019 showed:  FINDINGS:     The uterus is normal in its size and configuration with no  evidence of masses or focal abnormalities. The uterus  demonstrates a smooth contour and measures 7.7 x 3.9 x 4.9 cm.  The endometrial stripe measures 5 mm and is within normal limits.  No focal fluid collections are seen. The cervix appears normal.     Both ovaries are identified and appear normal. The right ovary  measures 3.4 x 3.2 x 2.4 cm. The left ovary measures 3.5 x 3.5 x  3.2 cm. No solid or cystic masses are seen and the adnexal  regions are clear bilaterally.     Small trace of free fluid is seen in the cul-de-sac.     IMPRESSION:  Unremarkable pelvic ultrasound. Small trace of free  fluid is seen in the  cul-de-sac.            ASSESSMENT AND PLAN:      1.  Iron deficiency anemia:  -Secondary to heavy menstrual blood loss.  - In view of severe anemia with hemoglobin of 6.7, patient received 3 units of PRBC on October 29, 2019.  - Patient is currently taking ferrous sulfate 1 tablet twice daily by mouth without any adverse reaction.  Recommend continue with ferrous sulfate for now.  -B12 level is 304, folate level is 14.  Will start patient on vitamin B12 thousand micrograms p.o. daily and folic acid 1 mg p.o. daily.  Prescription has been sent to her pharmacy on November 5, 2019.  - We will ask patient to return to clinic in 8 weeks with repeat CBC and anemia work-up to be done 2 days prior to next clinic visit.    2.  Thrombocytosis:  -Most recent platelet count done on October 4, 2019 was elevated at 541,000  - Her thrombocytopenia is reactive secondary to severe iron deficiency.  -We will replace iron deficiency first.  If patient continues to have persistent thrombocytosis after replacement of iron deficiency, she will need further work-up to rule out any myeloproliferative neoplasm like essential thrombocytosis.    3.  Leukocytosis:  -Reactive.  Will monitor with CBC for now.    4.  Health maintenance: Patient does not smoke.  Had a Pap smear done in last 1 year which was negative for malignancy.  She is only 23 years of age and not due for colonoscopy or mammogram.    5.  Pain assessment:  -Patient denies any pain today.        Thank you for this consultation.        Jermain Ojeda MD  11/4/2019  4:05 PM          EMR Dragon/Transcription disclaimer:   Much of this encounter note is an electronic transcription/translation of spoken language to printed text. The electronic translation of spoken language may permit erroneous, or at times, nonsensical words or phrases to be inadvertently transcribed; Although I have reviewed the note for such errors, some may still exist.

## 2019-11-04 ENCOUNTER — CONSULT (OUTPATIENT)
Dept: ONCOLOGY | Facility: CLINIC | Age: 24
End: 2019-11-04

## 2019-11-04 ENCOUNTER — APPOINTMENT (OUTPATIENT)
Dept: ONCOLOGY | Facility: HOSPITAL | Age: 24
End: 2019-11-04

## 2019-11-04 VITALS
TEMPERATURE: 97.7 F | SYSTOLIC BLOOD PRESSURE: 145 MMHG | BODY MASS INDEX: 40.65 KG/M2 | DIASTOLIC BLOOD PRESSURE: 92 MMHG | WEIGHT: 244 LBS | HEIGHT: 65 IN | HEART RATE: 97 BPM

## 2019-11-04 DIAGNOSIS — D72.828 OTHER ELEVATED WHITE BLOOD CELL (WBC) COUNT: ICD-10-CM

## 2019-11-04 DIAGNOSIS — D50.0 IRON DEFICIENCY ANEMIA DUE TO CHRONIC BLOOD LOSS: Primary | ICD-10-CM

## 2019-11-04 DIAGNOSIS — D50.0 IRON DEFICIENCY ANEMIA DUE TO CHRONIC BLOOD LOSS: ICD-10-CM

## 2019-11-04 DIAGNOSIS — D75.839 THROMBOCYTOSIS: ICD-10-CM

## 2019-11-04 PROBLEM — D72.829 LEUKOCYTOSIS: Status: ACTIVE | Noted: 2019-11-04

## 2019-11-04 LAB
ANISOCYTOSIS BLD QL: NORMAL
BASO STIPL COARSE BLD QL SMEAR: NORMAL
BASOPHILS # BLD AUTO: 0.09 10*3/MM3 (ref 0–0.2)
BASOPHILS NFR BLD AUTO: 1 % (ref 0–1.5)
DEPRECATED RDW RBC AUTO: 57.5 FL (ref 37–54)
EOSINOPHIL # BLD AUTO: 0.13 10*3/MM3 (ref 0–0.4)
EOSINOPHIL NFR BLD AUTO: 1.4 % (ref 0.3–6.2)
ERYTHROCYTE [DISTWIDTH] IN BLOOD BY AUTOMATED COUNT: 25 % (ref 12.3–15.4)
FERRITIN SERPL-MCNC: 17.05 NG/ML (ref 13–150)
HCT VFR BLD AUTO: 31.6 % (ref 34–46.6)
HGB BLD-MCNC: 9.4 G/DL (ref 12–15.9)
HGB RETIC QN AUTO: 25.3 PG (ref 29.8–36.1)
HYPOCHROMIA BLD QL: NORMAL
IMM GRANULOCYTES # BLD AUTO: 0.03 10*3/MM3 (ref 0–0.05)
IMM GRANULOCYTES NFR BLD AUTO: 0.3 % (ref 0–0.5)
IMM RETICS NFR: 28.4 % (ref 3–15.8)
IRON 24H UR-MRATE: 24 MCG/DL (ref 37–145)
IRON SATN MFR SERPL: 5 % (ref 20–50)
LYMPHOCYTES # BLD AUTO: 2.66 10*3/MM3 (ref 0.7–3.1)
LYMPHOCYTES NFR BLD AUTO: 28.4 % (ref 19.6–45.3)
MCH RBC QN AUTO: 19.9 PG (ref 26.6–33)
MCHC RBC AUTO-ENTMCNC: 29.7 G/DL (ref 31.5–35.7)
MCV RBC AUTO: 66.9 FL (ref 79–97)
MICROCYTES BLD QL: NORMAL
MONOCYTES # BLD AUTO: 0.86 10*3/MM3 (ref 0.1–0.9)
MONOCYTES NFR BLD AUTO: 9.2 % (ref 5–12)
NEUTROPHILS # BLD AUTO: 5.61 10*3/MM3 (ref 1.7–7)
NEUTROPHILS NFR BLD AUTO: 59.7 % (ref 42.7–76)
NRBC BLD AUTO-RTO: 0 /100 WBC (ref 0–0.2)
PLAT MORPH BLD: NORMAL
PLATELET # BLD AUTO: 428 10*3/MM3 (ref 140–450)
PMV BLD AUTO: 9.9 FL (ref 6–12)
POIKILOCYTOSIS BLD QL SMEAR: NORMAL
POLYCHROMASIA BLD QL SMEAR: NORMAL
RBC # BLD AUTO: 4.72 10*6/MM3 (ref 3.77–5.28)
RETICS/RBC NFR AUTO: 1.54 % (ref 0.7–1.9)
TIBC SERPL-MCNC: 460 MCG/DL (ref 298–536)
TRANSFERRIN SERPL-MCNC: 309 MG/DL (ref 200–360)
WBC MORPH BLD: NORMAL
WBC NRBC COR # BLD: 9.38 10*3/MM3 (ref 3.4–10.8)

## 2019-11-04 PROCEDURE — 82607 VITAMIN B-12: CPT | Performed by: INTERNAL MEDICINE

## 2019-11-04 PROCEDURE — 82728 ASSAY OF FERRITIN: CPT | Performed by: INTERNAL MEDICINE

## 2019-11-04 PROCEDURE — G8731 PAIN NEG NO PLAN: HCPCS | Performed by: INTERNAL MEDICINE

## 2019-11-04 PROCEDURE — 83540 ASSAY OF IRON: CPT | Performed by: INTERNAL MEDICINE

## 2019-11-04 PROCEDURE — 85025 COMPLETE CBC W/AUTO DIFF WBC: CPT | Performed by: INTERNAL MEDICINE

## 2019-11-04 PROCEDURE — 85046 RETICYTE/HGB CONCENTRATE: CPT | Performed by: INTERNAL MEDICINE

## 2019-11-04 PROCEDURE — 85007 BL SMEAR W/DIFF WBC COUNT: CPT | Performed by: INTERNAL MEDICINE

## 2019-11-04 PROCEDURE — G9903 PT SCRN TBCO ID AS NON USER: HCPCS | Performed by: INTERNAL MEDICINE

## 2019-11-04 PROCEDURE — 99204 OFFICE O/P NEW MOD 45 MIN: CPT | Performed by: INTERNAL MEDICINE

## 2019-11-04 PROCEDURE — 84466 ASSAY OF TRANSFERRIN: CPT | Performed by: INTERNAL MEDICINE

## 2019-11-04 PROCEDURE — 82746 ASSAY OF FOLIC ACID SERUM: CPT | Performed by: INTERNAL MEDICINE

## 2019-11-04 PROCEDURE — G0463 HOSPITAL OUTPT CLINIC VISIT: HCPCS | Performed by: INTERNAL MEDICINE

## 2019-11-05 ENCOUNTER — TELEPHONE (OUTPATIENT)
Dept: ONCOLOGY | Facility: HOSPITAL | Age: 24
End: 2019-11-05

## 2019-11-05 LAB
FOLATE SERPL-MCNC: 13.8 NG/ML (ref 4.78–24.2)
VIT B12 BLD-MCNC: 304 PG/ML (ref 211–946)

## 2019-11-05 RX ORDER — FOLIC ACID 1 MG/1
1 TABLET ORAL DAILY
Qty: 90 TABLET | Refills: 1 | Status: SHIPPED | OUTPATIENT
Start: 2019-11-05 | End: 2020-09-21 | Stop reason: SDUPTHER

## 2019-11-05 RX ORDER — LANOLIN ALCOHOL/MO/W.PET/CERES
1000 CREAM (GRAM) TOPICAL DAILY
Qty: 90 TABLET | Refills: 1 | Status: SHIPPED | OUTPATIENT
Start: 2019-11-05 | End: 2020-02-27

## 2019-11-05 NOTE — TELEPHONE ENCOUNTER
----- Message from Jermain Ojeda MD sent at 11/5/2019  7:38 AM CST -----  Please let patient know, her hemoglobin is improved to 9.4.  She needs to continue taking ferrous sulfate 1 tablet twice daily.  Her B12 and folate level is also intermediate.  I have sent prescription for B12 and folic acid to her pharmacy.  Thank you

## 2020-01-21 ENCOUNTER — LAB (OUTPATIENT)
Dept: LAB | Facility: OTHER | Age: 25
End: 2020-01-21

## 2020-01-21 DIAGNOSIS — D50.0 IRON DEFICIENCY ANEMIA DUE TO CHRONIC BLOOD LOSS: ICD-10-CM

## 2020-01-21 DIAGNOSIS — D72.828 OTHER ELEVATED WHITE BLOOD CELL (WBC) COUNT: ICD-10-CM

## 2020-01-21 DIAGNOSIS — D75.839 THROMBOCYTOSIS: ICD-10-CM

## 2020-01-21 LAB
ALBUMIN SERPL-MCNC: 4.2 G/DL (ref 3.5–5)
ALBUMIN/GLOB SERPL: 1.2 G/DL (ref 1.1–1.8)
ALP SERPL-CCNC: 74 U/L (ref 38–126)
ALT SERPL W P-5'-P-CCNC: 69 U/L
ANION GAP SERPL CALCULATED.3IONS-SCNC: 10 MMOL/L (ref 5–15)
ANISOCYTOSIS BLD QL: NORMAL
AST SERPL-CCNC: 67 U/L (ref 14–36)
BASOPHILS # BLD MANUAL: 0.08 10*3/MM3 (ref 0–0.2)
BASOPHILS NFR BLD AUTO: 1 % (ref 0–1.5)
BILIRUB SERPL-MCNC: 0.4 MG/DL (ref 0.2–1.3)
BUN BLD-MCNC: 12 MG/DL (ref 7–23)
BUN/CREAT SERPL: 15.4 (ref 7–25)
CALCIUM SPEC-SCNC: 9.1 MG/DL (ref 8.4–10.2)
CHLORIDE SERPL-SCNC: 108 MMOL/L (ref 101–112)
CO2 SERPL-SCNC: 24 MMOL/L (ref 22–30)
CREAT BLD-MCNC: 0.78 MG/DL (ref 0.52–1.04)
DEPRECATED RDW RBC AUTO: 48.8 FL (ref 37–54)
EOSINOPHIL # BLD MANUAL: 0.25 10*3/MM3 (ref 0–0.4)
EOSINOPHIL NFR BLD MANUAL: 3 % (ref 0.3–6.2)
ERYTHROCYTE [DISTWIDTH] IN BLOOD BY AUTOMATED COUNT: 18.9 % (ref 12.3–15.4)
FERRITIN SERPL-MCNC: 22.68 NG/ML (ref 13–150)
GFR SERPL CREATININE-BSD FRML MDRD: 91 ML/MIN/1.73 (ref 71–165)
GLOBULIN UR ELPH-MCNC: 3.4 GM/DL (ref 2.3–3.5)
GLUCOSE BLD-MCNC: 121 MG/DL (ref 70–99)
HCT VFR BLD AUTO: 34.8 % (ref 34–46.6)
HGB BLD-MCNC: 10.3 G/DL (ref 12–15.9)
HYPOCHROMIA BLD QL: NORMAL
IRON 24H UR-MRATE: 26 MCG/DL (ref 37–145)
IRON SATN MFR SERPL: 6 % (ref 20–50)
LYMPHOCYTES # BLD MANUAL: 2 10*3/MM3 (ref 0.7–3.1)
LYMPHOCYTES NFR BLD MANUAL: 24 % (ref 19.6–45.3)
LYMPHOCYTES NFR BLD MANUAL: 8 % (ref 5–12)
MCH RBC QN AUTO: 21.9 PG (ref 26.6–33)
MCHC RBC AUTO-ENTMCNC: 29.6 G/DL (ref 31.5–35.7)
MCV RBC AUTO: 73.9 FL (ref 79–97)
MICROCYTES BLD QL: NORMAL
MONOCYTES # BLD AUTO: 0.67 10*3/MM3 (ref 0.1–0.9)
NEUTROPHILS # BLD AUTO: 5.34 10*3/MM3 (ref 1.7–7)
NEUTROPHILS NFR BLD MANUAL: 64 % (ref 42.7–76)
PLAT MORPH BLD: NORMAL
PLATELET # BLD AUTO: 400 10*3/MM3 (ref 140–450)
PMV BLD AUTO: 10.1 FL (ref 6–12)
POLYCHROMASIA BLD QL SMEAR: NORMAL
POTASSIUM BLD-SCNC: 4.2 MMOL/L (ref 3.4–5)
PROT SERPL-MCNC: 7.6 G/DL (ref 6.3–8.6)
RBC # BLD AUTO: 4.71 10*6/MM3 (ref 3.77–5.28)
SCAN SLIDE: NORMAL
SODIUM BLD-SCNC: 142 MMOL/L (ref 137–145)
TIBC SERPL-MCNC: 402 MCG/DL (ref 298–536)
TRANSFERRIN SERPL-MCNC: 270 MG/DL (ref 200–360)
WBC MORPH BLD: NORMAL
WBC NRBC COR # BLD: 8.34 10*3/MM3 (ref 3.4–10.8)

## 2020-01-21 PROCEDURE — 85025 COMPLETE CBC W/AUTO DIFF WBC: CPT | Performed by: INTERNAL MEDICINE

## 2020-01-21 PROCEDURE — 82728 ASSAY OF FERRITIN: CPT | Performed by: INTERNAL MEDICINE

## 2020-01-21 PROCEDURE — 83540 ASSAY OF IRON: CPT | Performed by: INTERNAL MEDICINE

## 2020-01-21 PROCEDURE — 82607 VITAMIN B-12: CPT | Performed by: INTERNAL MEDICINE

## 2020-01-21 PROCEDURE — 84466 ASSAY OF TRANSFERRIN: CPT | Performed by: INTERNAL MEDICINE

## 2020-01-21 PROCEDURE — 82746 ASSAY OF FOLIC ACID SERUM: CPT | Performed by: INTERNAL MEDICINE

## 2020-01-21 PROCEDURE — 80053 COMPREHEN METABOLIC PANEL: CPT | Performed by: INTERNAL MEDICINE

## 2020-01-21 PROCEDURE — 36415 COLL VENOUS BLD VENIPUNCTURE: CPT | Performed by: INTERNAL MEDICINE

## 2020-01-22 LAB
FOLATE SERPL-MCNC: 8.96 NG/ML (ref 4.78–24.2)
VIT B12 BLD-MCNC: 294 PG/ML (ref 211–946)

## 2020-01-23 ENCOUNTER — OFFICE VISIT (OUTPATIENT)
Dept: HEMATOLOGY/ONCOLOGY | Facility: CLINIC | Age: 25
End: 2020-01-23

## 2020-01-23 ENCOUNTER — OFFICE VISIT (OUTPATIENT)
Dept: FAMILY MEDICINE CLINIC | Facility: CLINIC | Age: 25
End: 2020-01-23

## 2020-01-23 VITALS
SYSTOLIC BLOOD PRESSURE: 155 MMHG | DIASTOLIC BLOOD PRESSURE: 97 MMHG | RESPIRATION RATE: 18 BRPM | WEIGHT: 259 LBS | HEART RATE: 92 BPM | HEIGHT: 65 IN | OXYGEN SATURATION: 100 % | TEMPERATURE: 99 F | BODY MASS INDEX: 43.15 KG/M2

## 2020-01-23 VITALS
TEMPERATURE: 99 F | SYSTOLIC BLOOD PRESSURE: 180 MMHG | BODY MASS INDEX: 43.22 KG/M2 | DIASTOLIC BLOOD PRESSURE: 98 MMHG | HEART RATE: 113 BPM | HEIGHT: 65 IN | WEIGHT: 259.4 LBS

## 2020-01-23 DIAGNOSIS — Z83.3 FAMILY HISTORY OF DIABETES MELLITUS: ICD-10-CM

## 2020-01-23 DIAGNOSIS — D50.0 IRON DEFICIENCY ANEMIA DUE TO CHRONIC BLOOD LOSS: Primary | ICD-10-CM

## 2020-01-23 DIAGNOSIS — D72.828 OTHER ELEVATED WHITE BLOOD CELL (WBC) COUNT: ICD-10-CM

## 2020-01-23 DIAGNOSIS — D75.839 THROMBOCYTOSIS: ICD-10-CM

## 2020-01-23 DIAGNOSIS — D50.0 IRON DEFICIENCY ANEMIA DUE TO CHRONIC BLOOD LOSS: ICD-10-CM

## 2020-01-23 DIAGNOSIS — E66.01 CLASS 3 SEVERE OBESITY WITH SERIOUS COMORBIDITY AND BODY MASS INDEX (BMI) OF 40.0 TO 44.9 IN ADULT, UNSPECIFIED OBESITY TYPE (HCC): Chronic | ICD-10-CM

## 2020-01-23 DIAGNOSIS — I10 ESSENTIAL HYPERTENSION: Primary | ICD-10-CM

## 2020-01-23 PROBLEM — E53.8 B12 DEFICIENCY: Status: ACTIVE | Noted: 2020-01-23

## 2020-01-23 PROBLEM — E66.9 OBESITY: Status: ACTIVE | Noted: 2020-01-23

## 2020-01-23 PROBLEM — K90.9 IRON MALABSORPTION: Status: ACTIVE | Noted: 2020-01-23

## 2020-01-23 PROCEDURE — G9903 PT SCRN TBCO ID AS NON USER: HCPCS | Performed by: INTERNAL MEDICINE

## 2020-01-23 PROCEDURE — 99213 OFFICE O/P EST LOW 20 MIN: CPT | Performed by: NURSE PRACTITIONER

## 2020-01-23 PROCEDURE — 99214 OFFICE O/P EST MOD 30 MIN: CPT | Performed by: INTERNAL MEDICINE

## 2020-01-23 PROCEDURE — G8731 PAIN NEG NO PLAN: HCPCS | Performed by: INTERNAL MEDICINE

## 2020-01-23 RX ORDER — LOSARTAN POTASSIUM AND HYDROCHLOROTHIAZIDE 12.5; 5 MG/1; MG/1
1 TABLET ORAL DAILY
Qty: 30 TABLET | Refills: 2 | Status: SHIPPED | OUTPATIENT
Start: 2020-01-23 | End: 2020-02-21 | Stop reason: SDUPTHER

## 2020-01-23 RX ORDER — DIPHENHYDRAMINE HYDROCHLORIDE 50 MG/ML
50 INJECTION INTRAMUSCULAR; INTRAVENOUS AS NEEDED
Status: CANCELLED | OUTPATIENT
Start: 2020-01-30

## 2020-01-23 RX ORDER — CYANOCOBALAMIN 1000 UG/ML
1000 INJECTION, SOLUTION INTRAMUSCULAR; SUBCUTANEOUS ONCE
Status: CANCELLED | OUTPATIENT
Start: 2020-01-30

## 2020-01-23 RX ORDER — SODIUM CHLORIDE 9 MG/ML
250 INJECTION, SOLUTION INTRAVENOUS ONCE
Status: CANCELLED | OUTPATIENT
Start: 2020-01-30

## 2020-01-23 NOTE — PROGRESS NOTES
DATE OF VISIT: 1/23/2020      REASON FOR VISIT: Iron deficiency anemia due to menstrual bleeding, B12 deficiency      HISTORY OF PRESENT ILLNESS:    24-year-old female with medical problem consisting of obesity, heavy menstrual bleeding since her menarche was initially seen in consultation on November 4, 2019 for evaluation of severe iron deficiency anemia.  Patient has been started on ferrous sulfate 1 tablet twice daily, along with B12 and folic acid supplement.  Patient is here for 2-month follow-up appointment today.  States her menstrual bleeding is somewhat better after starting birth control pills.  States her fatigue is somewhat improved.  Denies any new lymph node enlargement.      PAST MEDICAL HISTORY:    Past Medical History:   Diagnosis Date   • Hypertension    • Obesity        SOCIAL HISTORY:    Social History     Tobacco Use   • Smoking status: Never Smoker   • Smokeless tobacco: Never Used   Substance Use Topics   • Alcohol use: Yes     Comment: occasional   • Drug use: No       Surgical History :  Past Surgical History:   Procedure Laterality Date   • ADENOIDECTOMY     • TONSILLECTOMY         ALLERGIES:    No Known Allergies      FAMILY HISTORY:  Family History   Problem Relation Age of Onset   • No Known Problems Mother    • No Known Problems Father    • Stroke Paternal Grandmother            REVIEW OF SYSTEMS:      CONSTITUTIONAL:   States her fatigue is improved after starting ferrous sulfate supplement. Denies any fever, chills or weight loss.      EYES: No visual disturbances. No discharge. No new lesions     ENMT:  No epistaxis, mouth sores or difficulty swallowing.     RESPIRATORY: Complains of intermittent shortness of breath with exertion.  No new cough or hemoptysis.     CARDIOVASCULAR:  No chest pain or palpitations.     GASTROINTESTINAL:  No abdominal pain nausea, vomiting or blood in the stool.     GENITOURINARY: No Dysuria or Hematuria.     MUSCULOSKELETAL:  No new back pain or  "arthralgia.     LYMPHATICS:  Denies any abnormal swollen glands anywhere in the body.     NEUROLOGICAL : No tingling or numbness. No headache or dizziness. No seizures or balance problems.     SKIN: No new skin lesions.     ENDOCRINE : No new hot or cold intolerance. No new polyuria . No polydipsia.          PHYSICAL EXAMINATION:      VITAL SIGNS:  /97   Pulse 92   Temp 99 °F (37.2 °C) (Temporal)   Resp 18   Ht 165.1 cm (65\")   Wt 117 kg (259 lb)   SpO2 100%   BMI 43.10 kg/m²       01/23/20  1505   Weight: 117 kg (259 lb)         ECOG performance status: 1     CONSTITUTIONAL:  Not in any distress.     EYES: Mild conjunctival Pallor. No Icterus. No Pterygium. Extraocular Movements intact.No ptosis.     ENMT:  Normocephalic, Atraumatic.No Facial Asymmetry noted.     NECK:  No adenopathy.Trachea midline. NO JVD.     RESPIRATORY:  Fair air entry bilateral. No rhonchi or wheezing.Fair respiratory effort.     CARDIOVASCULAR:  S1, S2. Regular rate and rhythm. No murmur or gallop appreciated.     ABDOMEN:  Soft, obese, nontender. Bowel sounds present in all four quadrants.  No Hepatosplenomegaly appreciated.     MUSCULOSKELETAL:  No edema.No Calf Tenderness.Normal range of motion.     NEUROLOGIC:    No  Motor or sensory deficit appreciated. Cranial Nerves 2-12 grossly intact.     SKIN : No new skin lesion identified. Skin is warm and dry to touch.     LYMPHATICS: No new enlarged lymph nodes in neck or supraclavicular area.     PSYCHIATRY: Alert, awake and oriented ×3.Normal affect.  Normal judgment.  Makes good eye contact.      DIAGNOSTIC DATA:    Glucose   Date Value Ref Range Status   01/21/2020 121 (H) 70 - 99 mg/dL Final     Sodium   Date Value Ref Range Status   01/21/2020 142 137 - 145 mmol/L Final     Potassium   Date Value Ref Range Status   01/21/2020 4.2 3.4 - 5.0 mmol/L Final     CO2   Date Value Ref Range Status   01/21/2020 24.0 22.0 - 30.0 mmol/L Final     Chloride   Date Value Ref Range Status "   01/21/2020 108 101 - 112 mmol/L Final     Anion Gap   Date Value Ref Range Status   01/21/2020 10.0 5.0 - 15.0 mmol/L Final     Creatinine   Date Value Ref Range Status   01/21/2020 0.78 0.52 - 1.04 mg/dL Final     BUN   Date Value Ref Range Status   01/21/2020 12 7 - 23 mg/dL Final     BUN/Creatinine Ratio   Date Value Ref Range Status   01/21/2020 15.4 7.0 - 25.0 Final     Calcium   Date Value Ref Range Status   01/21/2020 9.1 8.4 - 10.2 mg/dL Final     eGFR Non  Amer   Date Value Ref Range Status   01/21/2020 91 71 - 165 mL/min/1.73 Final     Alkaline Phosphatase   Date Value Ref Range Status   01/21/2020 74 38 - 126 U/L Final     Total Protein   Date Value Ref Range Status   01/21/2020 7.6 6.3 - 8.6 g/dL Final     ALT (SGPT)   Date Value Ref Range Status   01/21/2020 69 (H) <=35 U/L Final     AST (SGOT)   Date Value Ref Range Status   01/21/2020 67 (H) 14 - 36 U/L Final     Total Bilirubin   Date Value Ref Range Status   01/21/2020 0.4 0.2 - 1.3 mg/dL Final     Albumin   Date Value Ref Range Status   01/21/2020 4.20 3.50 - 5.00 g/dL Final     Globulin   Date Value Ref Range Status   01/21/2020 3.4 2.3 - 3.5 gm/dL Final     Lab Results   Component Value Date    WBC 8.34 01/21/2020    HGB 10.3 (L) 01/21/2020    HCT 34.8 01/21/2020    MCV 73.9 (L) 01/21/2020     01/21/2020     Lab Results   Component Value Date    NEUTROABS 5.34 01/21/2020    IRON 26 (L) 01/21/2020    TIBC 402 01/21/2020    LABIRON 6 (L) 01/21/2020    FERRITIN 22.68 01/21/2020    [id number] 294 01/21/2020    FOLATE 8.96 01/21/2020     No results found for: , LABCA2, AFPTM, HCGQUANT, , CHROMGRNA, [id number], CEA, REFLABREPO    Component      Latest Ref Rng & Units 11/4/2019 1/21/2020   Iron      37 - 145 mcg/dL 24 (L) 26 (L)   Iron Saturation      20 - 50 % 5 (L) 6 (L)   Transferrin      200 - 360 mg/dL 309 270   TIBC      298 - 536 mcg/dL 460 402       Component      Latest Ref Rng & Units 11/4/2019 1/21/2020   Vitamin  B-12      211 - 946 pg/mL 304 294             ASSESSMENT AND PLAN:      1.  Iron deficiency anemia:  -Secondary to heavy menstrual blood loss.  - In view of severe anemia with hemoglobin of 6.7, patient received 3 units of PRBC on October 29, 2019.  -Anemia work-up done on January 21, 2020 shows hemoglobin is minimally improved to 10.3 with MCV of 73.  -Patient is currently taking ferrous sulfate 1 tablet twice daily for last 2 and half months.  Iron saturation is not showing any improvement iron saturation is 6% this time which was 5% last time.  -Patient may have component of iron malabsorption.  -Recommend starting intravenous Injectafer in view of persistent iron deficiency.  Side effect of Injectafer including allergic reaction were discussed with patient today.  -We will ask patient to return to clinic in 2 months with repeat CBC and anemia work-up to be done 2 days prior to next clinic visit.  In the interim recommend continue with ferrous sulfate 1 tablet p.o. daily.      2.  B12 deficiency:  - Patient was started on B12 thousand micrograms p.o. daily in November 2019.  Patient has been taking B12 every day.  - Recent B12 level done on January 21, 2020 shows B12 level is decreased to 294.  Patient also has a B12 malabsorption.  - We will start patient on intramuscular B12 injection next week with starting of intravenous Injectafer.  If patient feels comfortable doing it at home, will send B12 intramuscular supply to her pharmacy after initial education next week.  -We will repeat B12 and folic acid upon next clinic visit in 2 months.  -Folate level is also decreased to 8.9, recommend starting folic acid 1 mg twice daily.        3.  Health maintenance: Patient does not smoke.  Had a Pap smear done in last 1 year which was negative for malignancy.  She is only 23 years of age and not due for colonoscopy or mammogram.        4. PHQ-9 Total Score:       Gayatri Neal reports a pain score of 0.  Given  her pain assessment as noted, treatment options were discussed and the following options were decided upon as a follow-up plan to address the patient's pain: No intervention required.         Jermain Ojeda MD  1/23/2020  3:20 PM        EMR Dragon/Transcription disclaimer:   Much of this encounter note is an electronic transcription/translation of spoken language to printed text. The electronic translation of spoken language may permit erroneous, or at times, nonsensical words or phrases to be inadvertently transcribed; Although I have reviewed the note for such errors, some may still exist.

## 2020-01-23 NOTE — PATIENT INSTRUCTIONS

## 2020-01-23 NOTE — PROGRESS NOTES
"Barbi Neal is a 24 y.o. female. Patient here today with complaints of Hypertension  pt here today with complaints of elevated blood pressure for the last 3 months or longer. States when she was seen at dentist's office yesterday her BP was 177/111 which pt states has been the highest she has noticed. Denies chest pain , shortness of breath, headache. She does continue to take OCPs but states her BP has been elevated prior to starting birth control. She denies tobacco, alcohol, rec drug use. States on average BP running 140s/90s.     Vitals:    01/23/20 1310   BP: 180/98   Pulse: 113   Temp: 99 °F (37.2 °C)   Weight: 118 kg (259 lb 6.4 oz)   Height: 165.1 cm (65\")   PainSc: 0-No pain     Body mass index is 43.17 kg/m².  Past Medical History:   Diagnosis Date   • Hypertension    • Obesity      Hypertension   This is a recurrent problem. The current episode started more than 1 month ago. The problem has been waxing and waning since onset. The problem is resistant. Associated symptoms include shortness of breath. Pertinent negatives include no anxiety, blurred vision, chest pain, headaches, malaise/fatigue, neck pain, orthopnea, palpitations, peripheral edema, PND or sweats. Agents associated with hypertension include oral contraceptives. Risk factors for coronary artery disease include obesity. Past treatments include ACE inhibitors and lifestyle changes. Current antihypertension treatment includes nothing. There are no compliance problems.         The following portions of the patient's history were reviewed and updated as appropriate: allergies, current medications, past family history, past medical history, past social history, past surgical history and problem list.    Review of Systems   Constitutional: Negative.  Negative for malaise/fatigue.   HENT: Negative.    Eyes: Negative.  Negative for blurred vision.   Respiratory: Positive for shortness of breath.    Cardiovascular: Negative.  " Negative for chest pain, palpitations, orthopnea and PND.   Gastrointestinal: Negative.    Endocrine: Negative.    Genitourinary: Negative.    Musculoskeletal: Negative.  Negative for neck pain.   Skin: Negative.    Allergic/Immunologic: Negative.    Neurological: Negative.  Negative for headaches.   Hematological: Negative.    Psychiatric/Behavioral: Negative.        Objective   Physical Exam   Constitutional: She is oriented to person, place, and time. She appears well-developed and well-nourished. No distress.   HENT:   Head: Normocephalic and atraumatic.   Neck: Normal carotid pulses present. Carotid bruit is not present.   Cardiovascular: Normal rate, regular rhythm and normal heart sounds. Exam reveals no gallop and no friction rub.   No murmur heard.  Pulmonary/Chest: Effort normal and breath sounds normal. No stridor. No respiratory distress. She has no wheezes. She has no rales.   Neurological: She is alert and oriented to person, place, and time.   Skin: Skin is warm and dry. No rash noted. She is not diaphoretic. No erythema. No pallor.   Psychiatric: She has a normal mood and affect. Her behavior is normal.   Nursing note and vitals reviewed.      Assessment/Plan   aGyatri was seen today for hypertension.    Diagnoses and all orders for this visit:    Essential hypertension    Class 3 severe obesity with serious comorbidity and body mass index (BMI) of 40.0 to 44.9 in adult, unspecified obesity type (CMS/HCC)    Iron deficiency anemia due to chronic blood loss    Family history of diabetes mellitus  Comments:  grandmother who did require dialysis due to diabetes according to pt     Other orders  -     losartan-hydrochlorothiazide (HYZAAR) 50-12.5 MG per tablet; Take 1 tablet by mouth Daily.    we had a long discussion regarding decreasing sodium , caffeine use and increasing exercise which she has already been following  She is started on hyzaar as above, advised on potential side effects of  medicine  Follow up in 1 month for recheck or sooner if nec  I did review with her labs which were obtained just a few days ago ordered by dr serrato  Pt is following up with him this afternoon as well for follow up of anemia  She is aware and is in agreement to this plan  All questions and concerns are addressed with understanding noted.

## 2020-01-31 ENCOUNTER — INFUSION (OUTPATIENT)
Dept: PEDIATRICS | Facility: HOSPITAL | Age: 25
End: 2020-01-31

## 2020-01-31 VITALS
DIASTOLIC BLOOD PRESSURE: 80 MMHG | TEMPERATURE: 98 F | HEART RATE: 104 BPM | SYSTOLIC BLOOD PRESSURE: 141 MMHG | RESPIRATION RATE: 20 BRPM

## 2020-01-31 DIAGNOSIS — D50.0 IRON DEFICIENCY ANEMIA DUE TO CHRONIC BLOOD LOSS: ICD-10-CM

## 2020-01-31 DIAGNOSIS — K90.9 IRON MALABSORPTION: Primary | ICD-10-CM

## 2020-01-31 PROCEDURE — 25010000002 FERRIC CARBOXYMALTOSE 750 MG/15ML SOLUTION 15 ML VIAL: Performed by: INTERNAL MEDICINE

## 2020-01-31 PROCEDURE — 96374 THER/PROPH/DIAG INJ IV PUSH: CPT | Performed by: INTERNAL MEDICINE

## 2020-01-31 RX ORDER — DIPHENHYDRAMINE HYDROCHLORIDE 50 MG/ML
50 INJECTION INTRAMUSCULAR; INTRAVENOUS AS NEEDED
Status: CANCELLED | OUTPATIENT
Start: 2020-02-07

## 2020-01-31 RX ORDER — METHYLPREDNISOLONE SODIUM SUCCINATE 125 MG/2ML
125 INJECTION, POWDER, LYOPHILIZED, FOR SOLUTION INTRAMUSCULAR; INTRAVENOUS AS NEEDED
Status: CANCELLED | OUTPATIENT
Start: 2020-02-07

## 2020-01-31 RX ORDER — SODIUM CHLORIDE 9 MG/ML
250 INJECTION, SOLUTION INTRAVENOUS ONCE
Status: COMPLETED | OUTPATIENT
Start: 2020-01-31 | End: 2020-01-31

## 2020-01-31 RX ORDER — SODIUM CHLORIDE 9 MG/ML
250 INJECTION, SOLUTION INTRAVENOUS ONCE
Status: CANCELLED | OUTPATIENT
Start: 2020-02-07

## 2020-01-31 RX ADMIN — SODIUM CHLORIDE 250 ML: 9 INJECTION, SOLUTION INTRAVENOUS at 15:36

## 2020-01-31 RX ADMIN — FERRIC CARBOXYMALTOSE INJECTION 750 MG: 50 INJECTION, SOLUTION INTRAVENOUS at 15:37

## 2020-02-07 ENCOUNTER — INFUSION (OUTPATIENT)
Dept: PEDIATRICS | Facility: HOSPITAL | Age: 25
End: 2020-02-07

## 2020-02-07 DIAGNOSIS — K90.9 IRON MALABSORPTION: Primary | ICD-10-CM

## 2020-02-07 DIAGNOSIS — E53.8 B12 DEFICIENCY: ICD-10-CM

## 2020-02-07 DIAGNOSIS — D50.0 IRON DEFICIENCY ANEMIA DUE TO CHRONIC BLOOD LOSS: ICD-10-CM

## 2020-02-07 PROCEDURE — 25010000002 CYANOCOBALAMIN PER 1000 MCG: Performed by: INTERNAL MEDICINE

## 2020-02-07 PROCEDURE — 96374 THER/PROPH/DIAG INJ IV PUSH: CPT | Performed by: INTERNAL MEDICINE

## 2020-02-07 PROCEDURE — 25010000002 FERRIC CARBOXYMALTOSE 750 MG/15ML SOLUTION 15 ML VIAL: Performed by: INTERNAL MEDICINE

## 2020-02-07 PROCEDURE — 96372 THER/PROPH/DIAG INJ SC/IM: CPT | Performed by: INTERNAL MEDICINE

## 2020-02-07 RX ORDER — CYANOCOBALAMIN 1000 UG/ML
1000 INJECTION, SOLUTION INTRAMUSCULAR; SUBCUTANEOUS ONCE
OUTPATIENT
Start: 2020-02-08

## 2020-02-07 RX ORDER — SODIUM CHLORIDE 9 MG/ML
250 INJECTION, SOLUTION INTRAVENOUS ONCE
Status: COMPLETED | OUTPATIENT
Start: 2020-02-07 | End: 2020-02-07

## 2020-02-07 RX ORDER — CYANOCOBALAMIN 1000 UG/ML
1000 INJECTION, SOLUTION INTRAMUSCULAR; SUBCUTANEOUS ONCE
Status: COMPLETED | OUTPATIENT
Start: 2020-02-07 | End: 2020-02-07

## 2020-02-07 RX ORDER — METHYLPREDNISOLONE SODIUM SUCCINATE 125 MG/2ML
125 INJECTION, POWDER, LYOPHILIZED, FOR SOLUTION INTRAMUSCULAR; INTRAVENOUS AS NEEDED
OUTPATIENT
Start: 2020-02-14

## 2020-02-07 RX ORDER — DIPHENHYDRAMINE HYDROCHLORIDE 50 MG/ML
50 INJECTION INTRAMUSCULAR; INTRAVENOUS AS NEEDED
OUTPATIENT
Start: 2020-02-14

## 2020-02-07 RX ORDER — SODIUM CHLORIDE 9 MG/ML
250 INJECTION, SOLUTION INTRAVENOUS ONCE
Status: CANCELLED | OUTPATIENT
Start: 2020-02-14

## 2020-02-07 RX ADMIN — FERRIC CARBOXYMALTOSE INJECTION 750 MG: 50 INJECTION, SOLUTION INTRAVENOUS at 15:47

## 2020-02-07 RX ADMIN — CYANOCOBALAMIN 1000 MCG: 1000 INJECTION, SOLUTION INTRAMUSCULAR; SUBCUTANEOUS at 16:13

## 2020-02-07 RX ADMIN — SODIUM CHLORIDE 250 ML: 9 INJECTION, SOLUTION INTRAVENOUS at 15:47

## 2020-02-07 NOTE — CODE DOCUMENTATION
Pt instructed on how to give her self b12 injection via demonstration from todays b12 injection given here. Pt states understanding. Instructed to call with any questions.

## 2020-02-21 ENCOUNTER — OFFICE VISIT (OUTPATIENT)
Dept: FAMILY MEDICINE CLINIC | Facility: CLINIC | Age: 25
End: 2020-02-21

## 2020-02-21 VITALS
BODY MASS INDEX: 43.55 KG/M2 | HEART RATE: 93 BPM | OXYGEN SATURATION: 98 % | TEMPERATURE: 99 F | HEIGHT: 65 IN | DIASTOLIC BLOOD PRESSURE: 84 MMHG | WEIGHT: 261.4 LBS | SYSTOLIC BLOOD PRESSURE: 126 MMHG

## 2020-02-21 DIAGNOSIS — I10 ESSENTIAL HYPERTENSION: ICD-10-CM

## 2020-02-21 DIAGNOSIS — K90.9 IRON MALABSORPTION: ICD-10-CM

## 2020-02-21 DIAGNOSIS — E66.01 CLASS 3 SEVERE OBESITY WITH SERIOUS COMORBIDITY AND BODY MASS INDEX (BMI) OF 40.0 TO 44.9 IN ADULT, UNSPECIFIED OBESITY TYPE (HCC): Primary | ICD-10-CM

## 2020-02-21 DIAGNOSIS — E53.8 B12 DEFICIENCY: ICD-10-CM

## 2020-02-21 PROCEDURE — 99214 OFFICE O/P EST MOD 30 MIN: CPT | Performed by: NURSE PRACTITIONER

## 2020-02-21 RX ORDER — LOSARTAN POTASSIUM AND HYDROCHLOROTHIAZIDE 12.5; 5 MG/1; MG/1
1 TABLET ORAL DAILY
Qty: 90 TABLET | Refills: 3 | Status: SHIPPED | OUTPATIENT
Start: 2020-02-21 | End: 2020-03-16 | Stop reason: RX

## 2020-02-21 NOTE — PATIENT INSTRUCTIONS

## 2020-02-21 NOTE — PROGRESS NOTES
"Barbi Neal is a 24 y.o. female. Patient here today with complaints of Follow-up (1 month )  pt here today for recheck of htn, doing well on hyzaar, denies side effects, denies headache, shortness of breath or chest pain. Reports BP much improved since starting this. She is continues to see heme/onc for iron infusions and b12 injections. She sees dr serrato. Reports at home she has been monitoring her BP closely and has been running -140s and DBP running 70-80s. She does state she has more energy now. Is concerned about obesity, weight gain. Has gained 2 pounds since last visit here. Has been on phentermine in the past, lisa well and had minimal weight loss.     Vitals:    02/21/20 1103   BP: 126/84   Pulse: 93   Temp: 99 °F (37.2 °C)   TempSrc: Tympanic   SpO2: 98%   Weight: 119 kg (261 lb 6.4 oz)   Height: 165.1 cm (65\")   PainSc: 0-No pain     Body mass index is 43.5 kg/m².  Past Medical History:   Diagnosis Date   • Hypertension    • Obesity      Obesity   This is a chronic problem. The current episode started more than 1 year ago. The problem occurs constantly. The problem has been waxing and waning. Pertinent negatives include no chest pain or headaches. The symptoms are aggravated by drinking. Treatments tried: diet, exercise, phentermine  The treatment provided mild relief.   Hypertension   This is a chronic problem. The current episode started more than 1 year ago. The problem has been gradually improving since onset. The problem is controlled. Pertinent negatives include no chest pain, headaches or shortness of breath. Agents associated with hypertension include oral contraceptives. Risk factors for coronary artery disease include obesity. Past treatments include lifestyle changes, diuretics and angiotensin blockers. Current antihypertension treatment includes lifestyle changes, diuretics and angiotensin blockers. The current treatment provides moderate improvement. Compliance " problems include exercise and diet.  Identifiable causes of hypertension include a hypertension causing med.        The following portions of the patient's history were reviewed and updated as appropriate: allergies, current medications, past family history, past medical history, past social history, past surgical history and problem list.    Review of Systems   Constitutional: Negative.    HENT: Negative.    Eyes: Negative.    Respiratory: Negative.  Negative for shortness of breath.    Cardiovascular: Negative.  Negative for chest pain.   Gastrointestinal: Negative.    Endocrine: Negative.    Genitourinary: Negative.    Musculoskeletal: Negative.    Skin: Negative.    Allergic/Immunologic: Negative.    Neurological: Negative.  Negative for headaches.   Hematological: Negative.    Psychiatric/Behavioral: Negative.        Objective   Physical Exam   Constitutional: She is oriented to person, place, and time. She appears well-developed and well-nourished. No distress.   HENT:   Head: Normocephalic and atraumatic.   Neck: Normal carotid pulses present. Carotid bruit is not present.   Cardiovascular: Normal rate, regular rhythm and normal heart sounds. Exam reveals no gallop and no friction rub.   No murmur heard.  Pulmonary/Chest: Effort normal and breath sounds normal. No stridor. No respiratory distress. She has no wheezes. She has no rales.   Musculoskeletal: She exhibits edema (trace pitting edema noted ble ).   Neurological: She is alert and oriented to person, place, and time.   Skin: Skin is warm and dry. No rash noted. She is not diaphoretic. No erythema. No pallor.   Psychiatric: She has a normal mood and affect. Her behavior is normal.   Nursing note and vitals reviewed.      Assessment/Plan   Gayatri was seen today for follow-up.    Diagnoses and all orders for this visit:    Class 3 severe obesity with serious comorbidity and body mass index (BMI) of 40.0 to 44.9 in adult, unspecified obesity type  (CMS/HCC)    Essential hypertension    Iron malabsorption    B12 deficiency    Other orders  -     losartan-hydrochlorothiazide (HYZAAR) 50-12.5 MG per tablet; Take 1 tablet by mouth Daily.  -     Liraglutide -Weight Management (SAXENDA) 18 MG/3ML solution pen-injector; Inject 0.6 mg under the skin into the appropriate area as directed Daily.    she is given refills, sent to mail order pharmacy, on hyzaar as above  Continue to monitor BP at home   She is started on saxenda as above with instruction on how to use, continue with diet and exercise  Follow up in 1  Months or sooner if nec  She is aware and is in agreement to this plan  Advised to discuss with ob/gyn changing ARB/diuretic to other antihypertensive if she desires or becomes pregnant   She is aware.   All questions and concerns are addressed with understanding noted.

## 2020-02-26 ENCOUNTER — TELEPHONE (OUTPATIENT)
Dept: ONCOLOGY | Facility: CLINIC | Age: 25
End: 2020-02-26

## 2020-03-16 RX ORDER — HYDROCHLOROTHIAZIDE 12.5 MG/1
12.5 CAPSULE, GELATIN COATED ORAL DAILY
Qty: 30 CAPSULE | Refills: 3 | Status: SHIPPED | OUTPATIENT
Start: 2020-03-16 | End: 2020-07-21

## 2020-03-16 RX ORDER — IRBESARTAN 150 MG/1
150 TABLET ORAL NIGHTLY
Qty: 30 TABLET | Refills: 3 | Status: SHIPPED | OUTPATIENT
Start: 2020-03-16 | End: 2020-07-21

## 2020-06-25 ENCOUNTER — APPOINTMENT (OUTPATIENT)
Dept: ONCOLOGY | Facility: CLINIC | Age: 25
End: 2020-06-25

## 2020-07-21 RX ORDER — LOSARTAN POTASSIUM AND HYDROCHLOROTHIAZIDE 12.5; 5 MG/1; MG/1
1 TABLET ORAL DAILY
Qty: 90 TABLET | Refills: 3 | Status: SHIPPED | OUTPATIENT
Start: 2020-07-21 | End: 2021-01-08 | Stop reason: ALTCHOICE

## 2020-07-21 RX ORDER — LOSARTAN POTASSIUM AND HYDROCHLOROTHIAZIDE 12.5; 5 MG/1; MG/1
1 TABLET ORAL DAILY
Qty: 30 TABLET | Refills: 5 | Status: SHIPPED | OUTPATIENT
Start: 2020-07-21 | End: 2020-07-21 | Stop reason: SDUPTHER

## 2020-09-14 ENCOUNTER — TELEPHONE (OUTPATIENT)
Dept: ONCOLOGY | Facility: CLINIC | Age: 25
End: 2020-09-14

## 2020-09-14 NOTE — TELEPHONE ENCOUNTER
Patient needs to schedule follow up with Dr. Ojeda.    She would like to get 9/21 if possible.  She is off work on that day.  She can come anytime on that day.    [phone]

## 2020-09-16 ENCOUNTER — LAB (OUTPATIENT)
Dept: LAB | Facility: OTHER | Age: 25
End: 2020-09-16

## 2020-09-16 DIAGNOSIS — D75.839 THROMBOCYTOSIS: ICD-10-CM

## 2020-09-16 DIAGNOSIS — D50.0 IRON DEFICIENCY ANEMIA DUE TO CHRONIC BLOOD LOSS: ICD-10-CM

## 2020-09-16 DIAGNOSIS — D72.828 OTHER ELEVATED WHITE BLOOD CELL (WBC) COUNT: ICD-10-CM

## 2020-09-16 LAB
ALBUMIN SERPL-MCNC: 4.7 G/DL (ref 3.5–5)
ALBUMIN/GLOB SERPL: 1.3 G/DL (ref 1.1–1.8)
ALP SERPL-CCNC: 64 U/L (ref 38–126)
ALT SERPL W P-5'-P-CCNC: 100 U/L
ANION GAP SERPL CALCULATED.3IONS-SCNC: 9 MMOL/L (ref 5–15)
AST SERPL-CCNC: 78 U/L (ref 14–36)
BASOPHILS # BLD AUTO: 0.03 10*3/MM3 (ref 0–0.2)
BASOPHILS NFR BLD AUTO: 0.3 % (ref 0–1.5)
BILIRUB SERPL-MCNC: 0.8 MG/DL (ref 0.2–1.3)
BUN SERPL-MCNC: 18 MG/DL (ref 7–23)
BUN/CREAT SERPL: 18.4 (ref 7–25)
CALCIUM SPEC-SCNC: 9.6 MG/DL (ref 8.4–10.2)
CHLORIDE SERPL-SCNC: 102 MMOL/L (ref 101–112)
CO2 SERPL-SCNC: 27 MMOL/L (ref 22–30)
CREAT SERPL-MCNC: 0.98 MG/DL (ref 0.52–1.04)
DEPRECATED RDW RBC AUTO: 44.1 FL (ref 37–54)
EOSINOPHIL # BLD AUTO: 0.13 10*3/MM3 (ref 0–0.4)
EOSINOPHIL NFR BLD AUTO: 1.3 % (ref 0.3–6.2)
ERYTHROCYTE [DISTWIDTH] IN BLOOD BY AUTOMATED COUNT: 14.3 % (ref 12.3–15.4)
FERRITIN SERPL-MCNC: 200.5 NG/ML (ref 13–150)
GFR SERPL CREATININE-BSD FRML MDRD: 70 ML/MIN/1.73 (ref 71–165)
GLOBULIN UR ELPH-MCNC: 3.7 GM/DL (ref 2.3–3.5)
GLUCOSE SERPL-MCNC: 94 MG/DL (ref 70–99)
HCT VFR BLD AUTO: 42.5 % (ref 34–46.6)
HGB BLD-MCNC: 14.4 G/DL (ref 12–15.9)
IRON 24H UR-MRATE: 61 MCG/DL (ref 37–145)
IRON SATN MFR SERPL: 17 % (ref 20–50)
LYMPHOCYTES # BLD AUTO: 2.75 10*3/MM3 (ref 0.7–3.1)
LYMPHOCYTES NFR BLD AUTO: 28.2 % (ref 19.6–45.3)
MCH RBC QN AUTO: 29.1 PG (ref 26.6–33)
MCHC RBC AUTO-ENTMCNC: 33.9 G/DL (ref 31.5–35.7)
MCV RBC AUTO: 86 FL (ref 79–97)
MONOCYTES # BLD AUTO: 1 10*3/MM3 (ref 0.1–0.9)
MONOCYTES NFR BLD AUTO: 10.2 % (ref 5–12)
NEUTROPHILS NFR BLD AUTO: 5.85 10*3/MM3 (ref 1.7–7)
NEUTROPHILS NFR BLD AUTO: 60 % (ref 42.7–76)
PLATELET # BLD AUTO: 335 10*3/MM3 (ref 140–450)
PMV BLD AUTO: 10.4 FL (ref 6–12)
POTASSIUM SERPL-SCNC: 4.4 MMOL/L (ref 3.4–5)
PROT SERPL-MCNC: 8.4 G/DL (ref 6.3–8.6)
RBC # BLD AUTO: 4.94 10*6/MM3 (ref 3.77–5.28)
SODIUM SERPL-SCNC: 138 MMOL/L (ref 137–145)
TIBC SERPL-MCNC: 359 MCG/DL (ref 298–536)
TRANSFERRIN SERPL-MCNC: 241 MG/DL (ref 200–360)
WBC # BLD AUTO: 9.76 10*3/MM3 (ref 3.4–10.8)

## 2020-09-16 PROCEDURE — 84466 ASSAY OF TRANSFERRIN: CPT | Performed by: INTERNAL MEDICINE

## 2020-09-16 PROCEDURE — 83540 ASSAY OF IRON: CPT | Performed by: INTERNAL MEDICINE

## 2020-09-16 PROCEDURE — 82746 ASSAY OF FOLIC ACID SERUM: CPT | Performed by: INTERNAL MEDICINE

## 2020-09-16 PROCEDURE — 80053 COMPREHEN METABOLIC PANEL: CPT | Performed by: INTERNAL MEDICINE

## 2020-09-16 PROCEDURE — 36415 COLL VENOUS BLD VENIPUNCTURE: CPT | Performed by: INTERNAL MEDICINE

## 2020-09-16 PROCEDURE — 85025 COMPLETE CBC W/AUTO DIFF WBC: CPT | Performed by: INTERNAL MEDICINE

## 2020-09-16 PROCEDURE — 82607 VITAMIN B-12: CPT | Performed by: INTERNAL MEDICINE

## 2020-09-16 PROCEDURE — 82728 ASSAY OF FERRITIN: CPT | Performed by: INTERNAL MEDICINE

## 2020-09-17 LAB
FOLATE SERPL-MCNC: 11.8 NG/ML (ref 4.78–24.2)
VIT B12 BLD-MCNC: 368 PG/ML (ref 211–946)

## 2020-09-18 ENCOUNTER — OFFICE VISIT (OUTPATIENT)
Dept: OBSTETRICS AND GYNECOLOGY | Facility: CLINIC | Age: 25
End: 2020-09-18

## 2020-09-18 ENCOUNTER — LAB (OUTPATIENT)
Dept: LAB | Facility: OTHER | Age: 25
End: 2020-09-18

## 2020-09-18 VITALS
BODY MASS INDEX: 42.55 KG/M2 | DIASTOLIC BLOOD PRESSURE: 82 MMHG | HEART RATE: 84 BPM | HEIGHT: 65 IN | WEIGHT: 255.4 LBS | SYSTOLIC BLOOD PRESSURE: 138 MMHG

## 2020-09-18 DIAGNOSIS — N91.2 AMENORRHEA: ICD-10-CM

## 2020-09-18 DIAGNOSIS — Z01.419 ENCOUNTER FOR GYNECOLOGICAL EXAMINATION WITH PAPANICOLAOU SMEAR OF CERVIX: Primary | ICD-10-CM

## 2020-09-18 LAB
B-HCG UR QL: NEGATIVE
INTERNAL NEGATIVE CONTROL: NEGATIVE
INTERNAL POSITIVE CONTROL: POSITIVE
Lab: NORMAL

## 2020-09-18 PROCEDURE — 81025 URINE PREGNANCY TEST: CPT | Performed by: NURSE PRACTITIONER

## 2020-09-18 PROCEDURE — 99395 PREV VISIT EST AGE 18-39: CPT | Performed by: NURSE PRACTITIONER

## 2020-09-18 RX ORDER — MEDROXYPROGESTERONE ACETATE 5 MG/1
5 TABLET ORAL DAILY
Qty: 10 TABLET | Refills: 0 | Status: SHIPPED | OUTPATIENT
Start: 2020-09-18 | End: 2020-12-07

## 2020-09-18 NOTE — PROGRESS NOTES
Subjective   Chief Complaint   Patient presents with   • Gynecologic Exam     well woman     Gayatri Neal is a 24 y.o. year old  presenting to be seen for her annual exam.  Today she has questions about not having period since April when she stopped OCP. She states since menarche at 9-9yo, her periods have been irregular. At times skipping several months. Last winter had excessive bleeding requiring transfusion. We got her managed on OCP and anemia has improved. She see's Dr. Ojeda next week for check up.  Hx of slightly elevated glucose and high cholesterol. She is actively trying to conceive.     Patient's last menstrual period was 2020 (exact date).    OB History        0    Para   0    Term   0       0    AB   0    Living   0       SAB   0    TAB   0    Ectopic   0    Molar   0    Multiple   0    Live Births   0                Current birth control method: none.    She is sexually active.  In the past 12 months there has not been new sexual partners.  Condoms are not typically used.  She would not like to be screened for STD's at today's exam. Getting  in 2019.     Past 6 month menstrual history:    Cycle Frequency: unpredictable  infrequent   Menstrual cycle character: flow is typically excessive   Cycle Duration: 3 days - 3 weeks (before OCP)   Number of heavy days of flows: Most days heavy   Dysmenorrhea: none   PMS: is not affecting her activities of daily living   Intermenstrual bleeding present: no   Post-coital bleeding present: no     She exercises regularly: no.  She wears her seat belt:yes.  She has concerns about domestic violence: no.  Last colonoscopy: Never  Last DEXA: Never  Last MMG: Never  Last pap: Never  History of abnormal PAP: N/A    The following portions of the patient's history were reviewed and updated as appropriate:problem list, current medications, allergies, past family history, past medical history, past social history and past  "surgical history.    Social History    Tobacco Use      Smoking status: Never Smoker      Smokeless tobacco: Never Used    Social History     Substance and Sexual Activity   Alcohol Use Yes    Comment: occasional      Review of Systems   Constitutional: Negative.  Negative for chills, fatigue and fever. Unexpected weight change: intentional weight loss with diet and exercise.   Respiratory: Negative.  Negative for shortness of breath.    Cardiovascular: Negative.    Gastrointestinal: Negative.  Negative for abdominal pain, constipation and diarrhea.   Genitourinary: Positive for menstrual problem (amenorrhea). Negative for dyspareunia, dysuria, pelvic pain, vaginal bleeding, vaginal discharge and vaginal pain.   Skin:        Darkening skin folds, no acne or hirsutism   Neurological: Negative for syncope, light-headedness and headaches.   Psychiatric/Behavioral: Negative.          Objective   /82   Pulse 84   Ht 165.1 cm (65\")   Wt 116 kg (255 lb 6.4 oz)   LMP 04/08/2020 (Exact Date) Comment: 14 days long  Breastfeeding No   BMI 42.50 kg/m²     General:  well developed; well nourished  no acute distress  obese - Body mass index is 42.5 kg/m².   Skin:  No suspicious lesions seen  acanthosis nigricans around the base of the neck, axillas bilaterally, and in the groin bilaterally   Thyroid: normal to inspection and palpation   Breasts:  Examined in supine position  Symmetric without masses or skin dimpling  Nipples normal without inversion, lesions or discharge  There are no palpable axillary nodes   Abdomen: soft, non-tender; no masses  no umbilical or inguinal hernias are present  no hepato-splenomegaly  Normal findings: bowel sounds normal   Cardiac: Heart sounds are normal.  Regular rate and rhythm without murmur, gallop or rub.   Resp: Normal expansion.  Clear to auscultation.  No rales, rhonchi, or wheezing.   Psych: alert,oriented, in NAD with a full range of affect, normal behavior and no psychotic " features   Pelvis: Uterus:  Exam limited by  body habitus  Clinical staff was present for exam  External genitalia:  hypopigmentation of the labia, hyperpigmentation and lichenifications in the groin bilaterally.   :  urethral meatus normal;  Vaginal:  normal pink mucosa without prolapse or lesions  Cervix:  normal appearance.  Uterus:  normal size, shape and consistency  Adnexa:  non palpable bilaterally.  Rectal:  digital rectal exam not performed; anus visually normal appearing.     Lab Review   CBC, CMP and iron panel, B12     Imaging  Pelvic ultrasound report       Gayatri was seen today for gynecologic exam.    Diagnoses and all orders for this visit:    Encounter for gynecological examination with Papanicolaou smear of cervix  -     Liquid-based Pap Smear, Screening    Amenorrhea  -     medroxyPROGESTERone (Provera) 5 MG tablet; Take 1 tablet by mouth Daily.  -     POC Pregnancy, Urine    UPT negative.     Pap results: I will send card in mail or call if abnormal. RTC annually for well woman exams.     Discussed her prolonged amenorrhea and the need to induce a period. Pt voices understanding of the risks of hyperplasia with induction. Warned pt that bleeding may be heavy and cramping may be more severe. Her H&H is great right now, so I am not too concerned about a single heavy period. But stressed the need for patient to contact me if it is excessive or prolonged. She voices understanding. After provera challenge bleeding, monitor for menses on her own. If not occurring and UPT is negative, pt will call to schedule an appt (can be telehealth) for further planning regarding possible clomid. Pt agrees with this plan of care.     Keep appt with Dr. Ojeda. Address elevated LFTs with him and PCP.     This note was electronically signed.    Lynda Garcia, APRN  September 18, 2020

## 2020-09-21 ENCOUNTER — OFFICE VISIT (OUTPATIENT)
Dept: ONCOLOGY | Facility: CLINIC | Age: 25
End: 2020-09-21

## 2020-09-21 VITALS
RESPIRATION RATE: 18 BRPM | HEART RATE: 62 BPM | HEIGHT: 65 IN | SYSTOLIC BLOOD PRESSURE: 148 MMHG | DIASTOLIC BLOOD PRESSURE: 82 MMHG | TEMPERATURE: 98 F | BODY MASS INDEX: 43.18 KG/M2 | WEIGHT: 259.2 LBS

## 2020-09-21 DIAGNOSIS — R79.89 ELEVATED LFTS: ICD-10-CM

## 2020-09-21 DIAGNOSIS — E53.8 B12 DEFICIENCY: ICD-10-CM

## 2020-09-21 DIAGNOSIS — D50.0 IRON DEFICIENCY ANEMIA DUE TO CHRONIC BLOOD LOSS: Primary | ICD-10-CM

## 2020-09-21 DIAGNOSIS — D75.839 THROMBOCYTOSIS: ICD-10-CM

## 2020-09-21 PROCEDURE — G0463 HOSPITAL OUTPT CLINIC VISIT: HCPCS | Performed by: INTERNAL MEDICINE

## 2020-09-21 PROCEDURE — 99214 OFFICE O/P EST MOD 30 MIN: CPT | Performed by: INTERNAL MEDICINE

## 2020-09-21 RX ORDER — FOLIC ACID 1 MG/1
1 TABLET ORAL DAILY
Qty: 90 TABLET | Refills: 1 | Status: SHIPPED | OUTPATIENT
Start: 2020-09-21 | End: 2022-07-25

## 2020-09-21 NOTE — PROGRESS NOTES
DATE OF VISIT: 9/21/2020      REASON FOR VISIT: Iron deficiency anemia due to heavy menstrual bleeding, B12 deficiency      HISTORY OF PRESENT ILLNESS:   24-year-old female with medical problem consisting of obesity, heavy menstrual bleeding, hypertension has been following up with [facility] since November 4, 2019 for iron deficiency anemia as well as B12 deficiency.  States her fatigue is improved.  Denies any menstrual bleeding since April 2020.  Denies any new lymph node enlargement.          PAST MEDICAL HISTORY:    Past Medical History:   Diagnosis Date   • Hypertension    • Obesity        SOCIAL HISTORY:    Social History     Tobacco Use   • Smoking status: Never Smoker   • Smokeless tobacco: Never Used   Substance Use Topics   • Alcohol use: Yes     Comment: occasional   • Drug use: No       Surgical History :  Past Surgical History:   Procedure Laterality Date   • ADENOIDECTOMY     • TONSILLECTOMY         ALLERGIES:    No Known Allergies      FAMILY HISTORY:  Family History   Problem Relation Age of Onset   • No Known Problems Mother    • No Known Problems Father    • Stroke Paternal Grandmother            REVIEW OF SYSTEMS:      CONSTITUTIONAL: States her fatigue is improved. Denies any fever, chills or weight loss.     EYES: No visual disturbances. No discharge. No new lesions    ENMT:  No epistaxis, mouth sores or difficulty swallowing.    RESPIRATORY: Positive for intermittent shortness of breath with exertion. No new cough or hemoptysis.    CARDIOVASCULAR:  No chest pain or palpitations.    GASTROINTESTINAL:  No abdominal pain nausea, vomiting or blood in the stool.    GENITOURINARY: No Dysuria or Hematuria.    MUSCULOSKELETAL:  No new back pain or arthralgia..    LYMPHATICS:  Denies any abnormal swollen glands anywhere in the body.    NEUROLOGICAL : No tingling or numbness. No headache or dizziness. No seizures or balance problems.    SKIN: No new skin lesions.    ENDOCRINE : No new hot or  "cold intolerance. No new polyuria . No polydipsia.        PHYSICAL EXAMINATION:      VITAL SIGNS:  /82   Pulse 62   Temp 98 °F (36.7 °C) (Temporal)   Resp 18   Ht 165.1 cm (65\")   Wt 118 kg (259 lb 3.2 oz)   BMI 43.13 kg/m²       09/21/20 0930   Weight: 118 kg (259 lb 3.2 oz)         ECOG performance status: 1    CONSTITUTIONAL:  Not in any distress.  Obese female.    EYES: Mild conjunctival Pallor. No Icterus. No Pterygium. Extraocular Movements intact.No ptosis.    ENMT:  Normocephalic, Atraumatic.No Facial Asymmetry noted.    NECK:  No adenopathy.Trachea midline. NO JVD.    RESPIRATORY:  Fair air entry bilateral. No rhonchi or wheezing.Fair respiratory effort.    CARDIOVASCULAR:  S1, S2. Regular rate and rhythm. No murmur or gallop appreciated.    ABDOMEN:  Soft, obese, nontender. Bowel sounds present in all four quadrants.  No Hepatosplenomegaly appreciated.    MUSCULOSKELETAL:  No edema.No Calf Tenderness.Normal range of motion.    NEUROLOGIC:    No  Motor or sensory deficit appreciated. Cranial Nerves 2-12 grossly intact.    SKIN : No new skin lesion identified. Skin is warm and moist to touch.    LYMPHATICS: No new enlarged lymph nodes in neck or supraclavicular area.    PSYCHIATRY: Alert, awake and oriented ×3.Normal affect.  Normal judgment.  Makes good eye contact.        DIAGNOSTIC DATA:    Glucose   Date Value Ref Range Status   09/16/2020 94 70 - 99 mg/dL Final     Sodium   Date Value Ref Range Status   09/16/2020 138 137 - 145 mmol/L Final     Potassium   Date Value Ref Range Status   09/16/2020 4.4 3.4 - 5.0 mmol/L Final     CO2   Date Value Ref Range Status   09/16/2020 27.0 22.0 - 30.0 mmol/L Final     Chloride   Date Value Ref Range Status   09/16/2020 102 101 - 112 mmol/L Final     Anion Gap   Date Value Ref Range Status   09/16/2020 9.0 5.0 - 15.0 mmol/L Final     Creatinine   Date Value Ref Range Status   09/16/2020 0.98 0.52 - 1.04 mg/dL Final     BUN   Date Value Ref Range Status "   09/16/2020 18 7 - 23 mg/dL Final     BUN/Creatinine Ratio   Date Value Ref Range Status   09/16/2020 18.4 7.0 - 25.0 Final     Calcium   Date Value Ref Range Status   09/16/2020 9.6 8.4 - 10.2 mg/dL Final     eGFR Non  Amer   Date Value Ref Range Status   09/16/2020 70 (L) 71 - 165 mL/min/1.73 Final     Alkaline Phosphatase   Date Value Ref Range Status   09/16/2020 64 38 - 126 U/L Final     Total Protein   Date Value Ref Range Status   09/16/2020 8.4 6.3 - 8.6 g/dL Final     ALT (SGPT)   Date Value Ref Range Status   09/16/2020 100 (H) <=35 U/L Final     AST (SGOT)   Date Value Ref Range Status   09/16/2020 78 (H) 14 - 36 U/L Final     Total Bilirubin   Date Value Ref Range Status   09/16/2020 0.8 0.2 - 1.3 mg/dL Final     Albumin   Date Value Ref Range Status   09/16/2020 4.70 3.50 - 5.00 g/dL Final     Globulin   Date Value Ref Range Status   09/16/2020 3.7 (H) 2.3 - 3.5 gm/dL Final     Lab Results   Component Value Date    WBC 9.76 09/16/2020    HGB 14.4 09/16/2020    HCT 42.5 09/16/2020    MCV 86.0 09/16/2020     09/16/2020     Lab Results   Component Value Date    NEUTROABS 5.85 09/16/2020    IRON 61 09/16/2020    TIBC 359 09/16/2020    LABIRON 17 (L) 09/16/2020    FERRITIN 200.50 (H) 09/16/2020    CPCYXJLQ07 368 09/16/2020    FOLATE 11.80 09/16/2020     No results found for: , LABCA2, AFPTM, HCGQUANT, , CHROMGRNA, [id number], CEA, REFLABREPO            ASSESSMENT AND PLAN:      1.  Iron deficiency anemia:  -Secondary to heavy menstrual blood loss.  - Patient has required PRBC in October 2019 due to severe anemia.  - Due to iron malabsorption, patient received 2 dose of intravenous Injectafer on January 31, 2020 and February 7, 2020.  - Anemia work-up done on September 16, 2020 shows elevated ferritin of 200 with iron saturation of 17% due to elevated ferritin would not start any intravenous Injectafer at present.  Patient remains on ferrous sulfate 1 tablet p.o. daily along with  prenatal vitamins.  - We will ask patient to return to clinic in 12 weeks with repeat CBC, CMP and anemia work-up to be done prior to that.    2.  B12 deficiency:  -Due to B12 malabsorption patient was started on intramuscular B12 injection in January 2020.  - Recent B12 level is 368.  Recommend monthly intramuscular B12 injection.  Prescription for B12 intramuscular supply has been sent to [facility] today.  - Folate level is 11.  Recommend folic acid 1 mg p.o. daily.  -We will repeat anemia work-up upon next clinic visit in 3 months.    3.  Elevated liver function:  -Likely due to fatty liver.  -Recent LFT shows worsening AST and ALT.  Patient has chronic elevated AST and ALT.  - Recommend getting an ultrasound of liver for further evaluation of elevated liver function test.  - We will also refer patient to gastroenterology clinic for further evaluation of persistent elevated LFTs.    4.  Health maintenance: Patient does not smoke.  Had a Pap smear in last 3 years.  She is only 24 years of age and not due for colonoscopy or mammogram.    5. Advance Care Planning: For now patient remains full code and is able to make decisions.  Patient has health care surrogate mentioned on chart.        PHQ-9 Total Score: 0   -Patient is not homicidal or suicidal.  No acute intervention required.    Gayatri Neal reports a pain score of 0.  Given her pain assessment as noted, treatment options were discussed and the following options were decided upon as a follow-up plan to address the patient's pain: No acute intervention required.         Jermain Ojeda MD  9/21/2020  09:54 CDT        Part of this note may be an electronic transcription/translation of spoken language to printed text using the Dragon Dictation System.

## 2020-09-24 LAB
LAB AP CASE REPORT: NORMAL
PATH INTERP SPEC-IMP: NORMAL

## 2020-09-28 ENCOUNTER — TELEPHONE (OUTPATIENT)
Dept: ONCOLOGY | Facility: HOSPITAL | Age: 25
End: 2020-09-28

## 2020-09-28 ENCOUNTER — OFFICE VISIT (OUTPATIENT)
Dept: GASTROENTEROLOGY | Facility: CLINIC | Age: 25
End: 2020-09-28

## 2020-09-28 VITALS
HEART RATE: 77 BPM | SYSTOLIC BLOOD PRESSURE: 162 MMHG | WEIGHT: 258.6 LBS | HEIGHT: 65 IN | BODY MASS INDEX: 43.09 KG/M2 | DIASTOLIC BLOOD PRESSURE: 108 MMHG

## 2020-09-28 DIAGNOSIS — K76.0 FATTY LIVER: Primary | ICD-10-CM

## 2020-09-28 PROCEDURE — 99214 OFFICE O/P EST MOD 30 MIN: CPT | Performed by: NURSE PRACTITIONER

## 2020-09-28 NOTE — PATIENT INSTRUCTIONS
Fatty Liver Disease    Fatty liver disease occurs when too much fat has built up in your liver cells. Fatty liver disease is also called hepatic steatosis or steatohepatitis. The liver removes harmful substances from your bloodstream and produces fluids that your body needs. It also helps your body use and store energy from the food you eat.  In many cases, fatty liver disease does not cause symptoms or problems. It is often diagnosed when tests are being done for other reasons. However, over time, fatty liver can cause inflammation that may lead to more serious liver problems, such as scarring of the liver (cirrhosis) and liver failure.  Fatty liver is associated with insulin resistance, increased body fat, high blood pressure (hypertension), and high cholesterol. These are features of metabolic syndrome and increase your risk for stroke, diabetes, and heart disease.  What are the causes?  This condition may be caused by:  · Drinking too much alcohol.  · Poor nutrition.  · Obesity.  · Cushing's syndrome.  · Diabetes.  · High cholesterol.  · Certain drugs.  · Poisons.  · Some viral infections.  · Pregnancy.  What increases the risk?  You are more likely to develop this condition if you:  · Abuse alcohol.  · Are overweight.  · Have diabetes.  · Have hepatitis.  · Have a high triglyceride level.  · Are pregnant.  What are the signs or symptoms?  Fatty liver disease often does not cause symptoms. If symptoms do develop, they can include:  · Fatigue.  · Weakness.  · Weight loss.  · Confusion.  · Abdominal pain.  · Nausea and vomiting.  · Yellowing of your skin and the white parts of your eyes (jaundice).  · Itchy skin.  How is this diagnosed?  This condition may be diagnosed by:  · A physical exam and medical history.  · Blood tests.  · Imaging tests, such as an ultrasound, CT scan, or MRI.  · A liver biopsy. A small sample of liver tissue is removed using a needle. The sample is then looked at under a microscope.  How  is this treated?  Fatty liver disease is often caused by other health conditions. Treatment for fatty liver may involve medicines and lifestyle changes to manage conditions such as:  · Alcoholism.  · High cholesterol.  · Diabetes.  · Being overweight or obese.  Follow these instructions at home:    · Do not drink alcohol. If you have trouble quitting, ask your health care provider how to safely quit with the help of medicine or a supervised program. This is important to keep your condition from getting worse.  · Eat a healthy diet as told by your health care provider. Ask your health care provider about working with a diet and nutrition specialist (dietitian) to develop an eating plan.  · Exercise regularly. This can help you lose weight and control your cholesterol and diabetes. Talk to your health care provider about an exercise plan and which activities are best for you.  · Take over-the-counter and prescription medicines only as told by your health care provider.  · Keep all follow-up visits as told by your health care provider. This is important.  Contact a health care provider if:  You have trouble controlling your:  · Blood sugar. This is especially important if you have diabetes.  · Cholesterol.  · Drinking of alcohol.  Get help right away if:  · You have abdominal pain.  · You have jaundice.  · You have nausea and vomiting.  · You vomit blood or material that looks like coffee grounds.  · You have stools that are black, tar-like, or bloody.  Summary  · Fatty liver disease develops when too much fat builds up in the cells of your liver.  · Fatty liver disease often causes no symptoms or problems. However, over time, fatty liver can cause inflammation that may lead to more serious liver problems, such as scarring of the liver (cirrhosis).  · You are more likely to develop this condition if you abuse alcohol, are pregnant, are overweight, have diabetes, have hepatitis, or have high triglyceride  levels.  · Contact your health care provider if you have trouble controlling your weight, blood sugar, cholesterol, or drinking of alcohol.  This information is not intended to replace advice given to you by your health care provider. Make sure you discuss any questions you have with your health care provider.  Document Released: 02/02/2007 Document Revised: 11/30/2018 Document Reviewed: 09/26/2018  Elsevier Patient Education © 2020 [employer]

## 2020-09-28 NOTE — PROGRESS NOTES
Chief Complaint   Patient presents with   • Elevated Hepatic Enzymes   • fatty liver       Subjective    Gayatri Neal is a 24 y.o. female. she is being seen for consultation today at the request of Jermain Ojeda MD    History of Present Illness  24-year-old female presents to discuss elevated liver function test.  Reports several years ago she was told she had fatty liver but no further work-up was done.  Review of lab notes liver enzymes have been elevated to about 6 times upper limits of normal since about  and then will drop down to about 2-3 times upper limits of normal which appears to be her baseline.  She denies any history of alcoholism or any illicit drug use at all.  States she has had a negative hepatitis panel.  Denies any family history of liver disease.  She does report family member with alpha-1 antitrypsin deficiency  Liver ultrasound 2020 noted fatty liver no focal abnormality seen.  Patient blood pressure is elevated but reports she forgot her blood pressure medication today she denies any chest pain pressure or tightness.  Denies headache.  If those symptoms develop she will seek emergency medical attention immediately.  The following portions of the patient's history were reviewed and updated as appropriate:   Past Medical History:   Diagnosis Date   • Hypertension    • Obesity      Past Surgical History:   Procedure Laterality Date   • ADENOIDECTOMY     • TONSILLECTOMY       Family History   Problem Relation Age of Onset   • No Known Problems Mother    • No Known Problems Father    • Stroke Paternal Grandmother      OB History        0    Para   0    Term   0       0    AB   0    Living   0       SAB   0    TAB   0    Ectopic   0    Molar   0    Multiple   0    Live Births   0              Prior to Admission medications    Medication Sig Start Date End Date Taking? Authorizing Provider   Cyanocobalamin 1000 MCG/ML kit VIT b12 1000 mcg monthly for 12 doses.  "9/21/20  Yes Jermain Ojeda MD   folic acid (FOLVITE) 1 MG tablet Take 1 tablet by mouth Daily. 9/21/20  Yes Jermain Ojeda MD   losartan-hydrochlorothiazide (Hyzaar) 50-12.5 MG per tablet Take 1 tablet by mouth Daily. 7/21/20  Yes Marilee Rocha APRN   medroxyPROGESTERone (Provera) 5 MG tablet Take 1 tablet by mouth Daily. 9/18/20  Yes Lynda Garcia APRN   metFORMIN ER (GLUCOPHAGE-XR) 500 MG 24 hr tablet Take 2 tablets by mouth Daily With Breakfast. 4/12/19  Yes Lynda Garcia APRN     No Known Allergies  Social History     Socioeconomic History   • Marital status:      Spouse name: Not on file   • Number of children: Not on file   • Years of education: Not on file   • Highest education level: Not on file   Tobacco Use   • Smoking status: Never Smoker   • Smokeless tobacco: Never Used   Substance and Sexual Activity   • Alcohol use: Yes     Comment: occasional   • Drug use: No   • Sexual activity: Yes     Partners: Male     Birth control/protection: None       Review of Systems  Review of Systems   Constitutional: Negative for activity change, appetite change, chills, diaphoresis, fatigue, fever and unexpected weight change.   HENT: Negative for sore throat and trouble swallowing.    Respiratory: Negative for shortness of breath.    Gastrointestinal: Negative for abdominal distention, abdominal pain, anal bleeding, blood in stool, constipation, diarrhea, nausea, rectal pain and vomiting.   Musculoskeletal: Negative for arthralgias.   Skin: Negative for pallor.   Neurological: Negative for light-headedness.        BP (!) 162/108 (BP Location: Left arm)   Pulse 77   Ht 165.1 cm (65\")   Wt 117 kg (258 lb 9.6 oz)   BMI 43.03 kg/m²     Objective    Physical Exam  Constitutional:       General: She is not in acute distress.     Appearance: Normal appearance. She is well-developed.   HENT:      Head: Normocephalic and atraumatic.   Neck:      Musculoskeletal: Normal range of " motion and neck supple.      Thyroid: No thyromegaly.   Cardiovascular:      Rate and Rhythm: Normal rate and regular rhythm.      Heart sounds: Normal heart sounds.   Pulmonary:      Effort: Pulmonary effort is normal.      Breath sounds: Normal breath sounds. No wheezing, rhonchi or rales.   Abdominal:      General: Bowel sounds are normal. There is no distension.      Palpations: Abdomen is soft. Abdomen is not rigid.      Tenderness: There is no abdominal tenderness. There is no guarding.      Hernia: No hernia is present.   Lymphadenopathy:      Cervical: No cervical adenopathy.   Skin:     General: Skin is warm and dry.      Coloration: Skin is not pale.      Findings: No rash.   Neurological:      Mental Status: She is alert and oriented to person, place, and time.   Psychiatric:         Speech: Speech normal.         Behavior: Behavior is cooperative.       Office Visit on 09/18/2020   Component Date Value Ref Range Status   • Case Report 09/18/2020    Final                    Value:Gynecologic Cytology Report                       Case: [id number]                                  Authorizing Provider:  Lynda Garcia    Collected:           09/18/2020 08:59 AM                                 WAN Gaines                                                                  Ordering Location:     [facility]     Received:            09/18/2020 11:36 AM                                 [facility]                                                               First Screen:          Marc Yu                                                              Specimen:    Sendout to [facility], Cervix                                                                    • Interpretation 09/18/2020 See attached report    Final   • HCG, Urine, QL 09/18/2020 Negative  Negative Final   • Lot Number 09/18/2020 [id number]   Final   • Internal Positive Control 09/18/2020 Positive   Final   • Internal Negative  Control 09/18/2020 Negative   Final     Assessment/Plan      1. Fatty liver    .   Discussed importance of weight loss for fatty liver disease recommend 1-2 cups of coffee per day unless contraindicated which is liver protective.  Will order lab work to rule out autoimmune hepatitis, primary biliary cholangitis, copper or iron overload syndrome and Germain to evaluate fatty liver.  Ultrasound was normal but will also obtain AFP.  Discussed possibility of liver biopsy pending results of the above.    Orders placed during this encounter include:  Orders Placed This Encounter   Procedures   • AFP tumor marker     Standing Status:   Future     Standing Expiration Date:   9/28/2021   • Anti-smooth muscle antibody titer     Standing Status:   Future     Standing Expiration Date:   9/28/2021   • Comprehensive metabolic panel     Standing Status:   Future     Standing Expiration Date:   9/28/2021   • Mitochondrial antibodies, M2     Standing Status:   Future     Standing Expiration Date:   9/28/2021   • Protime-INR     Standing Status:   Future     Standing Expiration Date:   9/28/2021   • Comprehensive Metabolic Panel   • CBC (No Diff)   • Alpha - 1 - Antitrypsin Phenotype     Standing Status:   Future     Standing Expiration Date:   9/28/2021   • GERMAIN Fibrosure       * Surgery not found *    Review and/or summary of lab tests, radiology, procedures, medications. Review and summary of old records and obtaining of history. The risks and benefits of my recommendations, as well as other treatment options were discussed with the patient today. Questions were answered.    No orders of the defined types were placed in this encounter.      Follow-up: Return in about 4 weeks (around 10/26/2020) for Recheck after labs .          This document has been electronically signed by WAN Mcgregor on September 29, 2020 09:48 CDT             Results for orders placed or performed in visit on 09/18/20   POC Pregnancy, Urine    Specimen:  Urine   Result Value Ref Range    HCG, Urine, QL Negative Negative    Lot Number [id number]     Internal Positive Control Positive     Internal Negative Control Negative    Liquid-based Pap Smear, Screening    Specimen: Cervix; ThinPrep Vial   Result Value Ref Range    Case Report       Gynecologic Cytology Report                       Case: [id number]                                  Authorizing Provider:  Lynda Garcia    Collected:           09/18/2020 08:59 AM                                 WAN Gaines                                                                  Ordering Location:     [facility]     Received:            09/18/2020 11:36 AM                                 [facility]                                                               First Screen:          Marc Yu                                                              Specimen:    Sendout to [facility], Cervix                                                                     Interpretation See attached report     Results for orders placed or performed in visit on 09/16/20   CBC Auto Differential    Specimen: Blood   Result Value Ref Range    WBC 9.76 3.40 - 10.80 10*3/mm3    RBC 4.94 3.77 - 5.28 10*6/mm3    Hemoglobin 14.4 12.0 - 15.9 g/dL    Hematocrit 42.5 34.0 - 46.6 %    MCV 86.0 79.0 - 97.0 fL    MCH 29.1 26.6 - 33.0 pg    MCHC 33.9 31.5 - 35.7 g/dL    RDW 14.3 12.3 - 15.4 %    RDW-SD 44.1 37.0 - 54.0 fl    MPV 10.4 6.0 - 12.0 fL    Platelets 335 140 - 450 10*3/mm3    Neutrophil % 60.0 42.7 - 76.0 %    Lymphocyte % 28.2 19.6 - 45.3 %    Monocyte % 10.2 5.0 - 12.0 %    Eosinophil % 1.3 0.3 - 6.2 %    Basophil % 0.3 0.0 - 1.5 %    Neutrophils, Absolute 5.85 1.70 - 7.00 10*3/mm3    Lymphocytes, Absolute 2.75 0.70 - 3.10 10*3/mm3    Monocytes, Absolute 1.00 (H) 0.10 - 0.90 10*3/mm3    Eosinophils, Absolute 0.13 0.00 - 0.40 10*3/mm3    Basophils, Absolute 0.03 0.00 - 0.20 10*3/mm3   Iron and TIBC    Specimen:  Blood   Result Value Ref Range    Iron 61 37 - 145 mcg/dL    Iron Saturation 17 (L) 20 - 50 %    Transferrin 241 200 - 360 mg/dL    TIBC 359 298 - 536 mcg/dL   Folate    Specimen: Blood   Result Value Ref Range    Folate 11.80 4.78 - 24.20 ng/mL   Ferritin    Specimen: Blood   Result Value Ref Range    Ferritin 200.50 (H) 13.00 - 150.00 ng/mL   Vitamin B12    Specimen: Blood   Result Value Ref Range    Vitamin B-12 368 211 - 946 pg/mL   Comprehensive Metabolic Panel    Specimen: Blood   Result Value Ref Range    Glucose 94 70 - 99 mg/dL    BUN 18 7 - 23 mg/dL    Creatinine 0.98 0.52 - 1.04 mg/dL    Sodium 138 137 - 145 mmol/L    Potassium 4.4 3.4 - 5.0 mmol/L    Chloride 102 101 - 112 mmol/L    CO2 27.0 22.0 - 30.0 mmol/L    Calcium 9.6 8.4 - 10.2 mg/dL    Total Protein 8.4 6.3 - 8.6 g/dL    Albumin 4.70 3.50 - 5.00 g/dL    ALT (SGPT) 100 (H) <=35 U/L    AST (SGOT) 78 (H) 14 - 36 U/L    Alkaline Phosphatase 64 38 - 126 U/L    Total Bilirubin 0.8 0.2 - 1.3 mg/dL    eGFR Non  Amer 70 (L) 71 - 165 mL/min/1.73    Globulin 3.7 (H) 2.3 - 3.5 gm/dL    A/G Ratio 1.3 1.1 - 1.8 g/dL    BUN/Creatinine Ratio 18.4 7.0 - 25.0    Anion Gap 9.0 5.0 - 15.0 mmol/L   Results for orders placed or performed in visit on 01/21/20   Scan Slide    Specimen: Blood   Result Value Ref Range    Scan Slide     CBC Auto Differential    Specimen: Blood   Result Value Ref Range    WBC 8.34 3.40 - 10.80 10*3/mm3    RBC 4.71 3.77 - 5.28 10*6/mm3    Hemoglobin 10.3 (L) 12.0 - 15.9 g/dL    Hematocrit 34.8 34.0 - 46.6 %    MCV 73.9 (L) 79.0 - 97.0 fL    MCH 21.9 (L) 26.6 - 33.0 pg    MCHC 29.6 (L) 31.5 - 35.7 g/dL    RDW 18.9 (H) 12.3 - 15.4 %    RDW-SD 48.8 37.0 - 54.0 fl    MPV 10.1 6.0 - 12.0 fL    Platelets 400 140 - 450 10*3/mm3   Iron and TIBC    Specimen: Blood   Result Value Ref Range    Iron 26 (L) 37 - 145 mcg/dL    Iron Saturation 6 (L) 20 - 50 %    Transferrin 270 200 - 360 mg/dL    TIBC 402 298 - 536 mcg/dL   Manual Differential     Specimen: Blood   Result Value Ref Range    Neutrophil % 64.0 42.7 - 76.0 %    Lymphocyte % 24.0 19.6 - 45.3 %    Monocyte % 8.0 5.0 - 12.0 %    Eosinophil % 3.0 0.3 - 6.2 %    Basophil % 1.0 0.0 - 1.5 %    Neutrophils Absolute 5.34 1.70 - 7.00 10*3/mm3    Lymphocytes Absolute 2.00 0.70 - 3.10 10*3/mm3    Monocytes Absolute 0.67 0.10 - 0.90 10*3/mm3    Eosinophils Absolute 0.25 0.00 - 0.40 10*3/mm3    Basophils Absolute 0.08 0.00 - 0.20 10*3/mm3    Anisocytosis Slight/1+ None Seen    Hypochromia Slight/1+ None Seen    Microcytes Slight/1+ None Seen    Polychromasia Slight/1+ None Seen    WBC Morphology Normal Normal    Platelet Morphology Normal Normal   Folate    Specimen: Blood   Result Value Ref Range    Folate 8.96 4.78 - 24.20 ng/mL   Ferritin    Specimen: Blood   Result Value Ref Range    Ferritin 22.68 13.00 - 150.00 ng/mL   Vitamin B12    Specimen: Blood   Result Value Ref Range    Vitamin B-12 294 211 - 946 pg/mL   Comprehensive Metabolic Panel    Specimen: Blood   Result Value Ref Range    Glucose 121 (H) 70 - 99 mg/dL    BUN 12 7 - 23 mg/dL    Creatinine 0.78 0.52 - 1.04 mg/dL    Sodium 142 137 - 145 mmol/L    Potassium 4.2 3.4 - 5.0 mmol/L    Chloride 108 101 - 112 mmol/L    CO2 24.0 22.0 - 30.0 mmol/L    Calcium 9.1 8.4 - 10.2 mg/dL    Total Protein 7.6 6.3 - 8.6 g/dL    Albumin 4.20 3.50 - 5.00 g/dL    ALT (SGPT) 69 (H) <=35 U/L    AST (SGOT) 67 (H) 14 - 36 U/L    Alkaline Phosphatase 74 38 - 126 U/L    Total Bilirubin 0.4 0.2 - 1.3 mg/dL    eGFR Non  Amer 91 71 - 165 mL/min/1.73    Globulin 3.4 2.3 - 3.5 gm/dL    A/G Ratio 1.2 1.1 - 1.8 g/dL    BUN/Creatinine Ratio 15.4 7.0 - 25.0    Anion Gap 10.0 5.0 - 15.0 mmol/L     *Note: Due to a large number of results and/or encounters for the requested time period, some results have not been displayed. A complete set of results can be found in Results Review.

## 2020-09-28 NOTE — TELEPHONE ENCOUNTER
----- Message from Jermain Ojeda MD sent at 9/25/2020  2:30 PM CDT -----  Please let patient know, her ultrasound of liver is consistent with fatty infiltration.  Encourage keeping appointment with gastroenterology for further discussion of abnormal liver function test.  Thank you

## 2020-09-29 ENCOUNTER — LAB (OUTPATIENT)
Dept: LAB | Facility: OTHER | Age: 25
End: 2020-09-29

## 2020-09-29 DIAGNOSIS — K76.0 FATTY LIVER: ICD-10-CM

## 2020-09-29 LAB
ALBUMIN SERPL-MCNC: 4.7 G/DL (ref 3.5–5)
ALBUMIN/GLOB SERPL: 1.4 G/DL (ref 1.1–1.8)
ALP SERPL-CCNC: 58 U/L (ref 38–126)
ALT SERPL W P-5'-P-CCNC: 90 U/L
ANION GAP SERPL CALCULATED.3IONS-SCNC: 11 MMOL/L (ref 5–15)
AST SERPL-CCNC: 54 U/L (ref 14–36)
BILIRUB SERPL-MCNC: 0.8 MG/DL (ref 0.2–1.3)
BUN SERPL-MCNC: 12 MG/DL (ref 7–23)
BUN/CREAT SERPL: 11.8 (ref 7–25)
CALCIUM SPEC-SCNC: 9.5 MG/DL (ref 8.4–10.2)
CHLORIDE SERPL-SCNC: 106 MMOL/L (ref 101–112)
CO2 SERPL-SCNC: 24 MMOL/L (ref 22–30)
CREAT SERPL-MCNC: 1.02 MG/DL (ref 0.52–1.04)
DEPRECATED RDW RBC AUTO: 45.1 FL (ref 37–54)
ERYTHROCYTE [DISTWIDTH] IN BLOOD BY AUTOMATED COUNT: 14.5 % (ref 12.3–15.4)
GFR SERPL CREATININE-BSD FRML MDRD: 67 ML/MIN/1.73 (ref 71–165)
GLOBULIN UR ELPH-MCNC: 3.4 GM/DL (ref 2.3–3.5)
GLUCOSE SERPL-MCNC: 92 MG/DL (ref 70–99)
HCT VFR BLD AUTO: 40.3 % (ref 34–46.6)
HGB BLD-MCNC: 13.4 G/DL (ref 12–15.9)
INR PPP: 0.96 (ref 0.8–1.2)
MCH RBC QN AUTO: 28.8 PG (ref 26.6–33)
MCHC RBC AUTO-ENTMCNC: 33.3 G/DL (ref 31.5–35.7)
MCV RBC AUTO: 86.7 FL (ref 79–97)
PLATELET # BLD AUTO: 305 10*3/MM3 (ref 140–450)
PMV BLD AUTO: 10.4 FL (ref 6–12)
POTASSIUM SERPL-SCNC: 4.2 MMOL/L (ref 3.4–5)
PROT SERPL-MCNC: 8.1 G/DL (ref 6.3–8.6)
PROTHROMBIN TIME: 12.7 SECONDS (ref 11.1–15.3)
RBC # BLD AUTO: 4.65 10*6/MM3 (ref 3.77–5.28)
SODIUM SERPL-SCNC: 141 MMOL/L (ref 137–145)
WBC # BLD AUTO: 8.09 10*3/MM3 (ref 3.4–10.8)

## 2020-09-29 PROCEDURE — 85610 PROTHROMBIN TIME: CPT | Performed by: NURSE PRACTITIONER

## 2020-09-29 PROCEDURE — 36415 COLL VENOUS BLD VENIPUNCTURE: CPT | Performed by: NURSE PRACTITIONER

## 2020-09-29 PROCEDURE — 83883 ASSAY NEPHELOMETRY NOT SPEC: CPT

## 2020-09-29 PROCEDURE — 85027 COMPLETE CBC AUTOMATED: CPT | Performed by: NURSE PRACTITIONER

## 2020-09-29 PROCEDURE — 82465 ASSAY BLD/SERUM CHOLESTEROL: CPT

## 2020-09-29 PROCEDURE — 83516 IMMUNOASSAY NONANTIBODY: CPT | Performed by: NURSE PRACTITIONER

## 2020-09-29 PROCEDURE — 82172 ASSAY OF APOLIPOPROTEIN: CPT

## 2020-09-29 PROCEDURE — 80053 COMPREHEN METABOLIC PANEL: CPT | Performed by: NURSE PRACTITIONER

## 2020-09-29 PROCEDURE — 36416 COLLJ CAPILLARY BLOOD SPEC: CPT | Performed by: NURSE PRACTITIONER

## 2020-09-29 PROCEDURE — 82104 ALPHA-1-ANTITRYPSIN PHENO: CPT | Performed by: NURSE PRACTITIONER

## 2020-09-29 PROCEDURE — 83010 ASSAY OF HAPTOGLOBIN QUANT: CPT

## 2020-09-29 PROCEDURE — 82977 ASSAY OF GGT: CPT

## 2020-09-29 PROCEDURE — 82103 ALPHA-1-ANTITRYPSIN TOTAL: CPT | Performed by: NURSE PRACTITIONER

## 2020-09-29 PROCEDURE — 82105 ALPHA-FETOPROTEIN SERUM: CPT | Performed by: NURSE PRACTITIONER

## 2020-09-29 PROCEDURE — 84478 ASSAY OF TRIGLYCERIDES: CPT | Performed by: NURSE PRACTITIONER

## 2020-09-30 LAB — ALPHA-FETOPROTEIN: 0.92 NG/ML (ref 0–8.3)

## 2020-10-01 LAB
ACTIN IGG SERPL-ACNC: 8 UNITS (ref 0–19)
MITOCHONDRIA M2 IGG SER-ACNC: <20 UNITS (ref 0–20)

## 2020-10-02 LAB
A2 MACROGLOB SERPL-MCNC: 265 MG/DL (ref 110–276)
ALT SERPL W P-5'-P-CCNC: 91 IU/L (ref 0–40)
APO A-I SERPL-MCNC: 117 MG/DL (ref 116–209)
AST SERPL W P-5'-P-CCNC: 51 IU/L (ref 0–40)
BILIRUB SERPL-MCNC: 0.6 MG/DL (ref 0–1.2)
CHOLEST SERPL-MCNC: 175 MG/DL (ref 100–199)
FIBROSIS SCORING:: ABNORMAL
FIBROSIS STAGE SERPL QL: ABNORMAL
GGT SERPL-CCNC: 36 IU/L (ref 0–60)
GLUCOSE SERPL-MCNC: 88 MG/DL (ref 65–99)
HAPTOGLOB SERPL-MCNC: 147 MG/DL (ref 33–278)
INTERPRETATIONS: (REFERENCE): ABNORMAL
LABORATORY COMMENT REPORT: ABNORMAL
LIVER FIBR SCORE SERPL CALC.FIBROSURE: 0.23 (ref 0–0.21)
NASH SCORING (REFERENCE): ABNORMAL
NECROINFLAMMATORY ACT GRADE SERPL QL: ABNORMAL
NECROINFLAMMATORY ACT SCORE SERPL: 0.75
SERVICE CMNT-IMP: ABNORMAL
STEATOSIS GRADE (REFERENCE): ABNORMAL
STEATOSIS GRADING (REFERENCE): ABNORMAL
STEATOSIS SCORE (REFERENCE): 0.72 (ref 0–0.3)
TRIGL SERPL-MCNC: 307 MG/DL (ref 0–149)

## 2020-10-05 LAB
A1AT PHENOTYP SERPL IFE: NORMAL
A1AT SERPL-MCNC: 141 MG/DL (ref 100–188)

## 2020-10-15 RX ORDER — HYDROCHLOROTHIAZIDE 12.5 MG/1
12.5 TABLET ORAL DAILY
Qty: 30 TABLET | Refills: 1 | Status: CANCELLED | OUTPATIENT
Start: 2020-10-15

## 2020-10-28 ENCOUNTER — LAB (OUTPATIENT)
Dept: LAB | Facility: OTHER | Age: 25
End: 2020-10-28

## 2020-10-28 ENCOUNTER — LAB REQUISITION (OUTPATIENT)
Dept: LAB | Facility: OTHER | Age: 25
End: 2020-10-28

## 2020-10-28 DIAGNOSIS — Z00.00 ROUTINE GENERAL MEDICAL EXAMINATION AT A HEALTH CARE FACILITY: ICD-10-CM

## 2020-10-28 PROCEDURE — UDS COCC - COLLECTION FEE ONLY: Performed by: INTERNAL MEDICINE

## 2020-11-22 PROCEDURE — U0003 INFECTIOUS AGENT DETECTION BY NUCLEIC ACID (DNA OR RNA); SEVERE ACUTE RESPIRATORY SYNDROME CORONAVIRUS 2 (SARS-COV-2) (CORONAVIRUS DISEASE [COVID-19]), AMPLIFIED PROBE TECHNIQUE, MAKING USE OF HIGH THROUGHPUT TECHNOLOGIES AS DESCRIBED BY CMS-2020-01-R: HCPCS | Performed by: NURSE PRACTITIONER

## 2020-11-23 ENCOUNTER — LAB (OUTPATIENT)
Dept: LAB | Facility: OTHER | Age: 25
End: 2020-11-23

## 2020-12-07 ENCOUNTER — OFFICE VISIT (OUTPATIENT)
Dept: OBSTETRICS AND GYNECOLOGY | Facility: CLINIC | Age: 25
End: 2020-12-07

## 2020-12-07 VITALS
HEIGHT: 65 IN | BODY MASS INDEX: 42.32 KG/M2 | SYSTOLIC BLOOD PRESSURE: 130 MMHG | HEART RATE: 79 BPM | WEIGHT: 254 LBS | DIASTOLIC BLOOD PRESSURE: 88 MMHG

## 2020-12-07 DIAGNOSIS — E28.2 PCOS (POLYCYSTIC OVARIAN SYNDROME): ICD-10-CM

## 2020-12-07 DIAGNOSIS — F32.A ANXIETY AND DEPRESSION: Primary | ICD-10-CM

## 2020-12-07 DIAGNOSIS — F41.9 ANXIETY AND DEPRESSION: Primary | ICD-10-CM

## 2020-12-07 DIAGNOSIS — I10 ESSENTIAL HYPERTENSION: ICD-10-CM

## 2020-12-07 PROCEDURE — 99213 OFFICE O/P EST LOW 20 MIN: CPT | Performed by: NURSE PRACTITIONER

## 2020-12-07 RX ORDER — MEDROXYPROGESTERONE ACETATE 5 MG/1
5 TABLET ORAL DAILY
Qty: 10 TABLET | Refills: 0 | Status: SHIPPED | OUTPATIENT
Start: 2020-12-07 | End: 2021-04-05

## 2020-12-07 RX ORDER — BUPROPION HYDROCHLORIDE 150 MG/1
150 TABLET ORAL EVERY MORNING
Qty: 30 TABLET | Refills: 2 | Status: SHIPPED | OUTPATIENT
Start: 2020-12-07 | End: 2021-02-05 | Stop reason: SDUPTHER

## 2020-12-08 NOTE — PROGRESS NOTES
"Barbi Neal is a 25 y.o. female.     History of Present Illness   Pt presents with concerns about continued amenorrhea since last provera menses induction in early Oct. Bleeding was only 5 days and light from 10/8-10/13. She had negative UPT today. We had previously discussed possibility of moving forward with clomid. Given COVID, holiday season and patient's emotional state, she and her  wish to postpone that step for now. Her weight is concerning to her as well. We have discussed that weight loss can be very beneficial in relating to fertility. She is struggling with feeling \"on edge\" all of the time, emotional with depression from the difficulty with fertility. Denies SI/HI. We had previously discussed starting Wellbutrin and pt is agreeable to this.     The following portions of the patient's history were reviewed and updated as appropriate: allergies, current medications, past family history, past medical history, past social history, past surgical history and problem list.    Review of Systems   Constitutional: Negative.    Respiratory: Negative.    Cardiovascular: Negative.    Genitourinary: Positive for menstrual problem (amenorrhea).   Psychiatric/Behavioral: Positive for agitation, depressed mood and stress. Negative for hallucinations, self-injury, suicidal ideas and negative for hyperactivity. The patient is nervous/anxious.        Objective    Vitals:    12/07/20 1410   BP: 130/88   Pulse: 79         12/07/20  1410   Weight: 115 kg (254 lb)     Body mass index is 42.27 kg/m².    Physical Exam  Vitals signs reviewed.   Constitutional:       Appearance: She is obese.   Pulmonary:      Effort: Pulmonary effort is normal.   Neurological:      Mental Status: She is alert and oriented to person, place, and time.   Psychiatric:         Attention and Perception: Attention normal.         Mood and Affect: Mood is depressed. Mood is not anxious. Affect is tearful.         Speech: " "Speech normal.         Behavior: Behavior normal.         Thought Content: Thought content normal.           Assessment/Plan   Diagnoses and all orders for this visit:    1. Anxiety and depression (Primary)  -     buPROPion XL (Wellbutrin XL) 150 MG 24 hr tablet; Take 1 tablet by mouth Every Morning.  Dispense: 30 tablet; Refill: 2    2. PCOS (polycystic ovarian syndrome)  -     medroxyPROGESTERone (Provera) 5 MG tablet; Take 1 tablet by mouth Daily.  Dispense: 10 tablet; Refill: 0    3. Essential hypertension    UPT is negative. Begin Provera 5mg daily x 10 days. Bleeding should begin after completing the 10 day regimen. Pt wishes to remain off of OCPs for now and just not actively TTC. She is going to focus on her mental health and weight management strategies. Pt agrees to begin Wellbutrin XL 150mg once daily for mood and possible weight loss assistance. Discussed a realistic timeline to effectiveness and the goal that it would help \"take the edge off\" and help her cope easier, not to remove all emotions and stress from her life. Pt voices understanding. I encouraged her to take measures to surround herself with supporting people and to remain off social media if this is a huge trigger for her in regards to fertility. Pt voices understanding.     Pt to call in 1 month if she feels a dose change is necessary or sooner PRN. She is also to let me know if/when she and her  want to pursue the next steps of ovulation induction. She will take a UPT every 3-4 weeks if amenorrheic and take provera at least q 3 months PRN.     We discussed her BP medication. Losartan is contraindicated in pregnancy. I will talk further with her PCP but we may go ahead and try to get her established on something safer in pregnancy since she isn't going to be preventing it at this time.            "

## 2020-12-21 ENCOUNTER — APPOINTMENT (OUTPATIENT)
Dept: ONCOLOGY | Facility: CLINIC | Age: 25
End: 2020-12-21

## 2021-01-08 RX ORDER — LABETALOL 100 MG/1
100 TABLET, FILM COATED ORAL 2 TIMES DAILY
Qty: 60 TABLET | Refills: 2 | Status: SHIPPED | OUTPATIENT
Start: 2021-01-08 | End: 2021-02-05 | Stop reason: SDUPTHER

## 2021-02-05 DIAGNOSIS — F41.9 ANXIETY AND DEPRESSION: ICD-10-CM

## 2021-02-05 DIAGNOSIS — F32.A ANXIETY AND DEPRESSION: ICD-10-CM

## 2021-02-05 RX ORDER — BUPROPION HYDROCHLORIDE 150 MG/1
150 TABLET ORAL EVERY MORNING
Qty: 90 TABLET | Refills: 1 | Status: SHIPPED | OUTPATIENT
Start: 2021-02-05 | End: 2021-07-12

## 2021-02-05 RX ORDER — LABETALOL 100 MG/1
100 TABLET, FILM COATED ORAL 2 TIMES DAILY
Qty: 180 TABLET | Refills: 1 | Status: SHIPPED | OUTPATIENT
Start: 2021-02-05 | End: 2021-07-12

## 2021-02-05 NOTE — PROGRESS NOTES
Pt is doing well on Wellbutrin XL 150mg daily. She has lost 10lb and is feeling much better emotionally. She is exercising and eating healthy balanced diet. She is also doing well on labetalol 100mg BID. BP is staying around 130/80. Pt is checking it regularly. We will continue at these doses at this time.

## 2021-02-19 RX ORDER — METFORMIN HYDROCHLORIDE 500 MG/1
TABLET, EXTENDED RELEASE ORAL
Qty: 180 TABLET | Refills: 3 | Status: SHIPPED | OUTPATIENT
Start: 2021-02-19 | End: 2022-01-24

## 2021-03-30 DIAGNOSIS — N91.2 AMENORRHEA: Primary | ICD-10-CM

## 2021-04-01 ENCOUNTER — LAB (OUTPATIENT)
Dept: LAB | Facility: OTHER | Age: 26
End: 2021-04-01

## 2021-04-01 DIAGNOSIS — N91.2 AMENORRHEA: ICD-10-CM

## 2021-04-01 LAB — B-HCG UR QL: NEGATIVE

## 2021-04-01 PROCEDURE — 81025 URINE PREGNANCY TEST: CPT | Performed by: NURSE PRACTITIONER

## 2021-04-05 ENCOUNTER — TELEMEDICINE (OUTPATIENT)
Dept: OBSTETRICS AND GYNECOLOGY | Facility: CLINIC | Age: 26
End: 2021-04-05

## 2021-04-05 VITALS — HEIGHT: 65 IN | BODY MASS INDEX: 40.48 KG/M2 | WEIGHT: 243 LBS

## 2021-04-05 DIAGNOSIS — E28.2 PCOS (POLYCYSTIC OVARIAN SYNDROME): ICD-10-CM

## 2021-04-05 DIAGNOSIS — N91.2 AMENORRHEA: Primary | ICD-10-CM

## 2021-04-05 PROCEDURE — 99214 OFFICE O/P EST MOD 30 MIN: CPT | Performed by: NURSE PRACTITIONER

## 2021-04-05 RX ORDER — MEDROXYPROGESTERONE ACETATE 5 MG/1
5 TABLET ORAL DAILY
Qty: 10 TABLET | Refills: 0 | Status: SHIPPED | OUTPATIENT
Start: 2021-04-05 | End: 2022-07-25

## 2021-04-05 RX ORDER — LETROZOLE 2.5 MG/1
2.5 TABLET, FILM COATED ORAL DAILY
Qty: 5 TABLET | Refills: 0 | Status: SHIPPED | OUTPATIENT
Start: 2021-04-05 | End: 2021-04-10

## 2021-04-06 NOTE — PROGRESS NOTES
Barbi Neal is a 25 y.o. female.     You have chosen to receive care through a telehealth visit.  Do you consent to use a video/audio connection for your medical care today? Yes    History of Present Illness   Pt presents with concerns of continued amenorrhea since last provera induction in December. She has known PCOS with hx of prolonged amenorrhea and also excessive menstruation requiring PRBC infusion. We previously had discussed ovulation induction and pt decided to postpone right before the holidays in attempt to lose weight. She continues taking metformin and Wellbutrin XL and is doing well. Moods are much improved. She is coping with things much better. She denies any GI side effects with Metformin. She is down approx 14lb in the last 3 months. She is ready to proceed with ovulation induction as well.     The following portions of the patient's history were reviewed and updated as appropriate: allergies, current medications, past family history, past medical history, past social history, past surgical history and problem list.    Review of Systems   Constitutional: Negative for chills and fever. Weight loss: intentional loss approx 14lb.   Gastrointestinal: Negative.  Negative for abdominal pain, diarrhea and nausea.   Genitourinary: Positive for menstrual problem (amenorrhea, PCOS). Negative for pelvic pain.   Psychiatric/Behavioral: Negative for depressed mood. The patient is not nervous/anxious.         Improved on wellbutrin       Objective   Physical Exam  Constitutional:       Appearance: Normal appearance.   Pulmonary:      Effort: Pulmonary effort is normal.   Neurological:      Mental Status: She is alert and oriented to person, place, and time.   Psychiatric:         Mood and Affect: Mood normal.         Behavior: Behavior normal.           Assessment/Plan   Diagnoses and all orders for this visit:    1. Amenorrhea (Primary)  -     letrozole (Femara) 2.5 MG tablet; Take 1  tablet by mouth Daily for 5 days. Beginning on cycle day 3 through cycle day 7.  Dispense: 5 tablet; Refill: 0  -     medroxyPROGESTERone (Provera) 5 MG tablet; Take 1 tablet by mouth Daily.  Dispense: 10 tablet; Refill: 0    2. PCOS (polycystic ovarian syndrome)  -     letrozole (Femara) 2.5 MG tablet; Take 1 tablet by mouth Daily for 5 days. Beginning on cycle day 3 through cycle day 7.  Dispense: 5 tablet; Refill: 0  -     medroxyPROGESTERone (Provera) 5 MG tablet; Take 1 tablet by mouth Daily.  Dispense: 10 tablet; Refill: 0    I discussed clomid vs letrozole. Evidence is suggesting Letrozole as the better option for PCOS patients. Pt is in agreement of moving forward with this.     Begin provera 5 mg daily x 10 days. First true day that you bleed following that, start counting as Cycle Day 1. Pt will let us know when her cycle day 1 is.    Begin Femara 2.5mg Cycle Days 3-7 and Guaifenisen 2Tbsp daily beginning the day after last femara dose and taken continually through ovulation.     Begin Ovulation predictor kits 3 days after last femara dose, have intercourse every other day beginning 3 days after last femara dose (Cycle day 10).  If you use lubricants, look into Pre-Seed Lubricant that should not interfere with sperm motility.    Have Day 21 progesterone lab draw; Urine HCG before next round of femara PRN.    Will try 3-6 successful rounds before referral.  Always continue to take Prenatal Vitamins with Folic Acid.     to have semen analysis after this round.     Pt to continue metformin, lobetalol and wellbutrin at this time.     Pt agrees with this plan of care.

## 2021-04-20 DIAGNOSIS — E28.2 PCOS (POLYCYSTIC OVARIAN SYNDROME): Primary | ICD-10-CM

## 2021-05-10 ENCOUNTER — LAB (OUTPATIENT)
Dept: LAB | Facility: OTHER | Age: 26
End: 2021-05-10

## 2021-05-10 DIAGNOSIS — E28.2 PCOS (POLYCYSTIC OVARIAN SYNDROME): ICD-10-CM

## 2021-05-10 LAB — PROGEST SERPL-MCNC: 0.08 NG/ML

## 2021-05-10 PROCEDURE — 36415 COLL VENOUS BLD VENIPUNCTURE: CPT | Performed by: NURSE PRACTITIONER

## 2021-05-10 PROCEDURE — 84144 ASSAY OF PROGESTERONE: CPT | Performed by: NURSE PRACTITIONER

## 2021-06-24 ENCOUNTER — LAB (OUTPATIENT)
Dept: LAB | Facility: OTHER | Age: 26
End: 2021-06-24

## 2021-07-12 DIAGNOSIS — F41.9 ANXIETY AND DEPRESSION: ICD-10-CM

## 2021-07-12 DIAGNOSIS — F32.A ANXIETY AND DEPRESSION: ICD-10-CM

## 2021-07-12 RX ORDER — BUPROPION HYDROCHLORIDE 150 MG/1
TABLET ORAL
Qty: 90 TABLET | Refills: 3 | Status: SHIPPED | OUTPATIENT
Start: 2021-07-12 | End: 2022-07-08

## 2021-07-12 RX ORDER — LABETALOL 100 MG/1
TABLET, FILM COATED ORAL
Qty: 180 TABLET | Refills: 3 | Status: SHIPPED | OUTPATIENT
Start: 2021-07-12 | End: 2022-07-08

## 2021-08-26 NOTE — TELEPHONE ENCOUNTER
Pt would like a call back regarding her B12 shots-she is now requesting to do them at home and would like to put in an order for this       Pt Contact: [phone]   Area M Indication Text: Tumors in this location are included in Area M (cheek, forehead, scalp, neck, jawline and pretibial skin).  Mohs surgery is indicated for tumors in these anatomic locations.

## 2022-01-24 DIAGNOSIS — E28.2 PCOS (POLYCYSTIC OVARIAN SYNDROME): ICD-10-CM

## 2022-01-24 DIAGNOSIS — N91.2 AMENORRHEA: ICD-10-CM

## 2022-01-24 RX ORDER — METFORMIN HYDROCHLORIDE 500 MG/1
1000 TABLET, EXTENDED RELEASE ORAL
Qty: 180 TABLET | Refills: 3 | Status: SHIPPED | OUTPATIENT
Start: 2022-01-24 | End: 2023-01-17 | Stop reason: SDUPTHER

## 2022-01-24 RX ORDER — METFORMIN HYDROCHLORIDE 500 MG/1
TABLET, EXTENDED RELEASE ORAL
Qty: 180 TABLET | Refills: 3 | Status: SHIPPED | OUTPATIENT
Start: 2022-01-24 | End: 2022-01-24 | Stop reason: SDUPTHER

## 2022-07-07 DIAGNOSIS — F41.9 ANXIETY AND DEPRESSION: ICD-10-CM

## 2022-07-07 DIAGNOSIS — F32.A ANXIETY AND DEPRESSION: ICD-10-CM

## 2022-07-08 RX ORDER — LABETALOL 100 MG/1
TABLET, FILM COATED ORAL
Qty: 180 TABLET | Refills: 0 | Status: SHIPPED | OUTPATIENT
Start: 2022-07-08 | End: 2022-07-25 | Stop reason: DRUGHIGH

## 2022-07-08 RX ORDER — BUPROPION HYDROCHLORIDE 150 MG/1
TABLET ORAL
Qty: 90 TABLET | Refills: 0 | Status: SHIPPED | OUTPATIENT
Start: 2022-07-08 | End: 2022-07-25 | Stop reason: SDUPTHER

## 2022-07-25 ENCOUNTER — LAB (OUTPATIENT)
Dept: LAB | Facility: OTHER | Age: 27
End: 2022-07-25

## 2022-07-25 PROCEDURE — 83525 ASSAY OF INSULIN: CPT | Performed by: NURSE PRACTITIONER

## 2022-07-25 PROCEDURE — 84439 ASSAY OF FREE THYROXINE: CPT | Performed by: NURSE PRACTITIONER

## 2022-07-25 PROCEDURE — 83036 HEMOGLOBIN GLYCOSYLATED A1C: CPT | Performed by: NURSE PRACTITIONER

## 2022-07-29 ENCOUNTER — CLINICAL SUPPORT (OUTPATIENT)
Dept: OBSTETRICS AND GYNECOLOGY | Facility: CLINIC | Age: 27
End: 2022-07-29

## 2022-07-29 VITALS — DIASTOLIC BLOOD PRESSURE: 98 MMHG | SYSTOLIC BLOOD PRESSURE: 138 MMHG

## 2022-07-29 DIAGNOSIS — I10 ESSENTIAL HYPERTENSION: Primary | ICD-10-CM

## 2022-07-29 PROCEDURE — 99212 OFFICE O/P EST SF 10 MIN: CPT | Performed by: NURSE PRACTITIONER

## 2022-07-29 RX ORDER — HYDROCHLOROTHIAZIDE 12.5 MG/1
12.5 TABLET ORAL DAILY
Qty: 30 TABLET | Refills: 1 | Status: SHIPPED | OUTPATIENT
Start: 2022-07-29 | End: 2022-09-20 | Stop reason: SDUPTHER

## 2022-07-29 NOTE — PROGRESS NOTES
BP improved but still elevated. Has been running 140/90's at home despite double lobetalol to 200mg. Add HCTZ 12.5 once daily and she will monitor at home this weekend. We will recheck in 1 week in office if home reading are not improving. Pt to FU with PCP for further evaluation of lipids and LFT and ongoing BP management. Since she is not TTC at this time, I strongly encouraged a strict diet and exercise regimen while we wait for husbands clearance. I would not recommend TTC until her health has improved.

## 2022-08-29 ENCOUNTER — OFFICE VISIT (OUTPATIENT)
Dept: OBSTETRICS AND GYNECOLOGY | Facility: CLINIC | Age: 27
End: 2022-08-29

## 2022-08-29 ENCOUNTER — LAB (OUTPATIENT)
Dept: LAB | Facility: OTHER | Age: 27
End: 2022-08-29

## 2022-08-29 VITALS
HEART RATE: 81 BPM | SYSTOLIC BLOOD PRESSURE: 118 MMHG | OXYGEN SATURATION: 97 % | HEIGHT: 65 IN | DIASTOLIC BLOOD PRESSURE: 72 MMHG | BODY MASS INDEX: 41.99 KG/M2 | WEIGHT: 252 LBS

## 2022-08-29 DIAGNOSIS — Z01.419 ENCOUNTER FOR GYNECOLOGICAL EXAMINATION WITH PAPANICOLAOU SMEAR OF CERVIX: Primary | ICD-10-CM

## 2022-08-29 DIAGNOSIS — N90.4 LICHEN SCLEROSUS OF FEMALE GENITALIA: ICD-10-CM

## 2022-08-29 DIAGNOSIS — E28.2 PCOS (POLYCYSTIC OVARIAN SYNDROME): ICD-10-CM

## 2022-08-29 LAB
B-HCG UR QL: NEGATIVE
EXPIRATION DATE: NORMAL
INTERNAL NEGATIVE CONTROL: NORMAL
INTERNAL POSITIVE CONTROL: NORMAL
Lab: NORMAL

## 2022-08-29 PROCEDURE — 81025 URINE PREGNANCY TEST: CPT | Performed by: NURSE PRACTITIONER

## 2022-08-29 PROCEDURE — 99395 PREV VISIT EST AGE 18-39: CPT | Performed by: NURSE PRACTITIONER

## 2022-08-29 RX ORDER — CLOBETASOL PROPIONATE 0.5 MG/G
1 OINTMENT TOPICAL NIGHTLY
Qty: 60 G | Refills: 1 | Status: SHIPPED | OUTPATIENT
Start: 2022-08-29

## 2022-08-29 NOTE — PROGRESS NOTES
Subjective   Chief Complaint   Patient presents with   • Gynecologic Exam     WWRAVI Neal is a 26 y.o. year old  presenting to be seen for her annual exam.  She has known hx of PCOS. Is not actively TTC since  just completed varicocele but is ready to return to [facility] as soon as he is cleared in the next few months.     Patient's last menstrual period was 07/15/2022 (exact date). She has periods about every 3 months and takes provera PRN to induce that bleeding. She will be ready to start fertility treatments again as soon as  is cleared. She is working on a low fat diet. Insulin had improved and is now at 43. She continues on Metformin. Still noted fatty liver. BP is better today. She has been checking it at home and states it is running normal now, outside of a few stressful days.     OB History        0    Para   0    Term   0       0    AB   0    Living   0       SAB   0    IAB   0    Ectopic   0    Molar   0    Multiple   0    Live Births   0                Current birth control method: none.    She is sexually active.  In the past 12 months there has not been new sexual partners.  Condoms are not typically used.  She would not like to be screened for STD's at today's exam.     Past 6 month menstrual history:    Cycle Frequency: unpredictable  infrequent   Menstrual cycle character: flow is typically normal   Cycle Duration: 7   Number of heavy days of flows: 2-3   Dysmenorrhea: none   PMS: is not affecting her activities of daily living   Intermenstrual bleeding present: no   Post-coital bleeding present: no     She exercises regularly: no.  She wears her seat belt:yes.  She has concerns about domestic violence: no.  Last colonoscopy: Never  Last DEXA: Never  Last MMG: Never  Last pap: 2020  History of abnormal PAP: No    The following portions of the patient's history were reviewed and updated as appropriate:problem list, current medications, allergies,  "past family history, past medical history, past social history and past surgical history.    Social History    Tobacco Use      Smoking status: Never Smoker      Smokeless tobacco: Never Used    Social History     Substance and Sexual Activity   Alcohol Use Yes    Comment: occasional      Review of Systems   Constitutional: Negative.  Negative for chills, fatigue and fever. Unexpected weight change: attempting intentional weight loss with diet and exercise    Respiratory: Negative.  Negative for shortness of breath.    Cardiovascular: Negative.    Gastrointestinal: Negative.  Negative for abdominal pain, constipation and diarrhea.   Genitourinary: Positive for menstrual problem (amenorrhea ). Negative for dyspareunia, dysuria, pelvic pain, vaginal bleeding, vaginal discharge and vaginal pain.   Skin: Positive for rash (around the perianal area, present for a few years ).        Darkening skin folds, no acne or hirsutism   Neurological: Negative for syncope, light-headedness and headaches.   Psychiatric/Behavioral: The patient is nervous/anxious.         Life stressors         Objective   /72   Pulse 81   Ht 165.1 cm (65\")   Wt 114 kg (252 lb)   LMP 07/15/2022 (Exact Date)   SpO2 97%   Breastfeeding No   BMI 41.93 kg/m²     General:  well developed; well nourished  no acute distress  obese - Body mass index is 41.93 kg/m².   Skin:  No suspicious lesions seen  acanthosis nigricans around the base of the neck, axillas bilaterally, and in the groin bilaterally improving some over the last 2 years    Thyroid: normal to inspection and palpation   Breasts:  Examined in supine position  Symmetric without masses or skin dimpling  Nipples normal without inversion, lesions or discharge  There are no palpable axillary nodes   Abdomen: soft, non-tender; no masses  no umbilical or inguinal hernias are present  no hepato-splenomegaly  Normal findings: bowel sounds normal   Cardiac: Heart sounds are normal.  Regular " rate and rhythm without murmur, gallop or rub.   Resp: Normal expansion.  Clear to auscultation.  No rales, rhonchi, or wheezing.   Psych: alert,oriented, in NAD with a full range of affect, normal behavior and no psychotic features   Pelvis: Uterus:  Exam limited by  body habitus  Clinical staff was present for exam  External genitalia:  hypopigmentation and lichenifications surrounding the anus and extending bilaterally up on the inner labia majora adjacent to the vagina. but not extending up any futher. There area few angiokeratoma appearing arreas and a scaley texture around the base of hypopigmentation below the anus. .   :  urethral meatus normal;  Vaginal:  normal pink mucosa without prolapse or lesions  Cervix:  normal appearance.  Uterus:  normal size, shape and consistency  Adnexa:  non palpable bilaterally.  Rectal:  digital rectal exam not performed; anus visually normal appearing.     Lab Review   CBC, CMP, FSH, TSH and iron panel, B12     Imaging  Pelvic ultrasound report       Diagnoses and all orders for this visit:    1. Encounter for gynecological examination with Papanicolaou smear of cervix (Primary)  -     LIQUID-BASED PAP SMEAR, [facility] ([facility])    2. PCOS (polycystic ovarian syndrome)  -     POC Pregnancy, Urine    3. Lichen sclerosus of female genitalia  -     clobetasol (TEMOVATE) 0.05 % ointment; Apply 1 application topically to the appropriate area as directed Every Night.  Dispense: 60 g; Refill: 1    UPT negative.     Pap results: I will send card in mail or call if abnormal. RTC annually for well woman exams.     SBE Encouraged monthly.     Reviewed the importance of menses q 3 months to prevent hyperplasia and carcinoma. Pt voices understanding. If  gets clearance from urology, we will return to fertility measures for Gayatri. Pt will keep us posted.     Discussed vulvar findings. It does seem to have progressed some in the last 2 years. She does have some irritation now  as well. I recommend clobetasol nightly x 1 month. If symptoms do not improve, pt it to RTC for further assessment and vulvar biopsy. If doing well, we will wean off steroids and monitor closely.     This note was electronically signed.    Lynda Garcia, APRN  August 30, 2022

## 2022-08-31 LAB — REF LAB TEST METHOD: NORMAL

## 2022-09-20 DIAGNOSIS — I10 ESSENTIAL HYPERTENSION: ICD-10-CM

## 2022-09-20 RX ORDER — HYDROCHLOROTHIAZIDE 12.5 MG/1
12.5 TABLET ORAL DAILY
Qty: 90 TABLET | Refills: 1 | Status: SHIPPED | OUTPATIENT
Start: 2022-09-20 | End: 2023-04-03

## 2022-09-26 ENCOUNTER — OFFICE VISIT (OUTPATIENT)
Dept: FAMILY MEDICINE CLINIC | Facility: CLINIC | Age: 27
End: 2022-09-26

## 2022-09-26 VITALS
DIASTOLIC BLOOD PRESSURE: 82 MMHG | BODY MASS INDEX: 42.32 KG/M2 | HEIGHT: 65 IN | WEIGHT: 254 LBS | SYSTOLIC BLOOD PRESSURE: 124 MMHG | OXYGEN SATURATION: 97 % | HEART RATE: 82 BPM

## 2022-09-26 DIAGNOSIS — E28.2 PCOS (POLYCYSTIC OVARIAN SYNDROME): Chronic | ICD-10-CM

## 2022-09-26 DIAGNOSIS — E16.1 HYPERINSULINEMIA: Chronic | ICD-10-CM

## 2022-09-26 DIAGNOSIS — N97.9 FEMALE INFERTILITY: ICD-10-CM

## 2022-09-26 DIAGNOSIS — E66.01 CLASS 3 SEVERE OBESITY DUE TO EXCESS CALORIES WITH SERIOUS COMORBIDITY AND BODY MASS INDEX (BMI) OF 40.0 TO 44.9 IN ADULT: Primary | Chronic | ICD-10-CM

## 2022-09-26 PROCEDURE — 99214 OFFICE O/P EST MOD 30 MIN: CPT | Performed by: NURSE PRACTITIONER

## 2022-09-26 NOTE — PROGRESS NOTES
"Chief Complaint  Obesity (Pt requesting to start a medication to help with weight loss.)    Subjective        Gayatri Neal presents to [facility]  History of Present Illness    The patient presents today with complaints of weight issues and inquires about starting Ozempic. She complains that she has been the same weight for 2 years now. The patient notes that she has been going to the gym and has been drinking more water. She has been trying to lose weight for years without success.     The patient adds that she has not been on birth control since 2019. She notes that she has not ovulated for \"some time\" and adds that she has \"no luck on the baby.\"  She reports Provera and Femara were ineffective. She denies past usage of Clomid. She notes that her partner has gotten a surgery done to enhance sperm production for fertilization.  She reports that she is prediabetic on metformin. She has had     Objective   Vital Signs:  /82   Pulse 82   Ht 165.1 cm (65\")   Wt 115 kg (254 lb)   SpO2 97%   BMI 42.27 kg/m²   Estimated body mass index is 42.27 kg/m² as calculated from the following:    Height as of this encounter: 165.1 cm (65\").    Weight as of this encounter: 115 kg (254 lb).    Class 3 Severe Obesity (BMI >=40). Obesity-related health conditions include the following: hypertension. Obesity is worsening. BMI is is above average; BMI management plan is completed. We discussed portion control, increasing exercise and pharmacologic options including ozempic.      Physical Exam  Vitals and nursing note reviewed.   Constitutional:       General: She is not in acute distress.     Appearance: Normal appearance. She is obese. She is not ill-appearing, toxic-appearing or diaphoretic.   HENT:      Head: Normocephalic and atraumatic.   Cardiovascular:      Rate and Rhythm: Normal rate and regular rhythm.      Heart sounds: Normal heart sounds. No murmur " heard.    No friction rub. No gallop.   Pulmonary:      Effort: Pulmonary effort is normal. No respiratory distress.      Breath sounds: Normal breath sounds. No stridor. No wheezing, rhonchi or rales.   Skin:     General: Skin is warm and dry.   Neurological:      Mental Status: She is alert and oriented to person, place, and time.   Psychiatric:         Attention and Perception: Attention normal.         Mood and Affect: Mood is anxious. Affect is tearful.         Speech: Speech normal.         Behavior: Behavior normal. Behavior is cooperative.         Thought Content: Thought content normal.         Cognition and Memory: Cognition normal.         Judgment: Judgment normal.      Comments: Appears well kempt, tearful with conversation when discussing infertility especially as well as when discussing inability to lose weight with diet and exercise alone.        Result Review :           Lab results dated 07/25/2022 were reviewed.   Latest Reference Range & Units 07/25/22 12:03   Glucose 70 - 99 mg/dL 105 (H)   Sodium 137 - 145 mmol/L 141   Potassium 3.4 - 5.0 mmol/L 4.8   CO2 22.0 - 30.0 mmol/L 24.0   Chloride 101 - 112 mmol/L 108   Anion Gap 5.0 - 15.0 mmol/L 9.0   Creatinine 0.52 - 1.04 mg/dL 1.08 (H)   BUN 7 - 23 mg/dL 11   BUN/Creatinine Ratio 7.0 - 25.0  10.2   Calcium 8.4 - 10.2 mg/dL 9.6   eGFR >60.0 mL/min/1.73 72.8   Alkaline Phosphatase 38 - 126 U/L 61   Total Protein 6.3 - 8.6 g/dL 7.1   ALT (SGPT) <=35 U/L 63 (H)   AST (SGOT) 14 - 36 U/L 44 (H)   Total Bilirubin 0.2 - 1.3 mg/dL 0.6   Albumin 3.50 - 5.00 g/dL 4.30   Globulin 2.3 - 3.5 gm/dL 2.8   A/G Ratio 1.1 - 1.8 g/dL 1.5   Hemoglobin A1C 4.80 - 5.60 % 5.60   Insulin 2.6 - 24.9 uIU/mL 43.5 (H)   TSH Baseline 0.270 - 4.200 uIU/mL 3.030   Free T4 0.93 - 1.70 ng/dL 1.25   Total Cholesterol 150 - 200 mg/dL 191   HDL Cholesterol 40 - 59 mg/dL 37 (L)   LDL Cholesterol  <=100 mg/dL 99   VLDL Cholesterol 5 - 40 mg/dL 55 (H)   Triglycerides <=150 mg/dL 329 (H)    LDL/HDL Ratio 0.00 - 3.22  2.38   WBC 3.40 - 10.80 10*3/mm3 8.09   RBC 3.77 - 5.28 10*6/mm3 4.75   Hemoglobin 12.0 - 15.9 g/dL 12.9   Hematocrit 34.0 - 46.6 % 39.9   RDW 12.3 - 15.4 % 14.5   MCV 79.0 - 97.0 fL 84.0   MCH 26.6 - 33.0 pg 27.2   MCHC 31.5 - 35.7 g/dL 32.3   MPV 6.0 - 12.0 fL 10.3   Platelets 140 - 450 10*3/mm3 321   RDW-SD 37.0 - 54.0 fl 43.5   Neutrophil Rel % 42.7 - 76.0 % 59.2   Lymphocyte Rel % 19.6 - 45.3 % 27.3   Monocyte Rel % 5.0 - 12.0 % 10.3   Eosinophil Rel % 0.3 - 6.2 % 2.7   Basophil Rel % 0.0 - 1.5 % 0.5   Neutrophils Absolute 1.70 - 7.00 10*3/mm3 4.79   Lymphocytes Absolute 0.70 - 3.10 10*3/mm3 2.21   Monocytes Absolute 0.10 - 0.90 10*3/mm3 0.83   Eosinophils Absolute 0.00 - 0.40 10*3/mm3 0.22   Basophils Absolute 0.00 - 0.20 10*3/mm3 0.04   (H): Data is abnormally high  (L): Data is abnormally low     Assessment and Plan   Diagnoses and all orders for this visit:    1. Class 3 severe obesity due to excess calories with serious comorbidity and body mass index (BMI) of 40.0 to 44.9 in adult (HCC) (Primary)    2. Female infertility    3. Hyperinsulinemia    4. PCOS (polycystic ovarian syndrome)    Other orders  -     Semaglutide,0.25 or 0.5MG/DOS, (OZEMPIC) 2 MG/1.5ML solution pen-injector; Inject 0.5 mg under the skin into the appropriate area as directed 1 (One) Time Per Week.  Dispense: 1.5 mL; Refill: 1    We have had a long discussion regarding diet, exercise, weight management and options for PCOS, hyperinsulinemia, weight and infertility. I will start her on Ozempic 0.25 mg weekly x2, then increase to 0.5 mg weekly. She is given instruction verbally as well as demonstration on how to use pen. She understands the potential side effects of the medication. She will follow up in 1 month for recheck or sooner if needed. If Ozempic is not covered for her, she will call me back and let me know so that we can try to get it changed to another medicine that might be covered. She will follow  up with Lynda Muñoz as scheduled regarding PCOS, infertility, and hyperinsulinemia. All questions and concerns are addressed with understanding noted. The patient is in agreement with the above plan.     I spent 33 minutes caring for Gayatri on this date of service. This time includes time spent by me in the following activities:preparing for the visit, reviewing tests, performing a medically appropriate examination and/or evaluation , counseling and educating the patient/family/caregiver, ordering medications, tests, or procedures and documenting information in the medical record  Follow Up   Return in about 4 weeks (around 10/24/2022), or if symptoms worsen or fail to improve, for Recheck, or sooner as needed.  Patient was given instructions and counseling regarding her condition or for health maintenance advice. Please see specific information pulled into the AVS if appropriate.     Transcribed from ambient dictation for WAN Ortiz by Reva Hawk.  09/26/22   09:16 CDT    Patient verbalized consent to the visit recording.  I have personally performed the services described in this document as transcribed by the above individual, and it is both accurate and complete.  WAN Ortiz  9/26/2022  14:47 CDT      Answers for HPI/ROS submitted by the patient on 9/23/2022  What is the primary reason for your visit?: Physical

## 2022-09-29 ENCOUNTER — TELEPHONE (OUTPATIENT)
Dept: FAMILY MEDICINE CLINIC | Facility: CLINIC | Age: 27
End: 2022-09-29

## 2022-09-29 RX ORDER — TIRZEPATIDE 2.5 MG/.5ML
2.5 INJECTION, SOLUTION SUBCUTANEOUS WEEKLY
Qty: 0.5 ML | Refills: 0 | Status: SHIPPED | OUTPATIENT
Start: 2022-09-29 | End: 2022-09-29 | Stop reason: SDUPTHER

## 2022-09-29 RX ORDER — TIRZEPATIDE 2.5 MG/.5ML
2.5 INJECTION, SOLUTION SUBCUTANEOUS WEEKLY
Qty: 0.5 ML | Refills: 0 | Status: SHIPPED | OUTPATIENT
Start: 2022-09-29 | End: 2022-10-25 | Stop reason: SDUPTHER

## 2022-09-29 NOTE — TELEPHONE ENCOUNTER
Patient informed  ----- Message from Naomi Gonzalez sent at 9/28/2022  1:49 PM CDT -----  It can be, but I think it will be denied if we use the diagnosis of obesity.  I think saxenda would be a better chance of an approval if it is not a plan exclusion.  I also just found out that if a patient has commercial insurance and the medication Mounjaro is denied they can use a coupon card and get it for $25.00 a month.  Birgit the drug rep for Mounjaro said this would change in October but if they started it before it changed it would be good for the entire year. If that is an option your interested in and would like to talk to Birgit her phone number is [phone].  If you want me to try the ozempic just let me know.  It never hurts to try....  Tricia  ----- Message -----  From: Nancy Le MA  Sent: 9/28/2022   1:40 PM CDT  To: Naomi Gonzalez    Patients ozempic not covered by insurance. Can PA be done on this?    ThanksNancy

## 2022-10-17 ENCOUNTER — PRIOR AUTHORIZATION (OUTPATIENT)
Dept: FAMILY MEDICINE CLINIC | Facility: CLINIC | Age: 27
End: 2022-10-17

## 2022-10-17 NOTE — TELEPHONE ENCOUNTER
Gayatri Neal (Key: [id number]) - [id number]  Mounjaro 2.5MG/0.5ML pen-injectors     Status: PA Response - Denied  Sent: October 10th, 2022

## 2022-10-25 ENCOUNTER — OFFICE VISIT (OUTPATIENT)
Dept: FAMILY MEDICINE CLINIC | Facility: CLINIC | Age: 27
End: 2022-10-25

## 2022-10-25 VITALS
TEMPERATURE: 98.6 F | DIASTOLIC BLOOD PRESSURE: 80 MMHG | HEART RATE: 73 BPM | OXYGEN SATURATION: 98 % | WEIGHT: 243.2 LBS | HEIGHT: 65 IN | SYSTOLIC BLOOD PRESSURE: 120 MMHG | BODY MASS INDEX: 40.52 KG/M2

## 2022-10-25 DIAGNOSIS — E66.01 CLASS 3 SEVERE OBESITY DUE TO EXCESS CALORIES WITH SERIOUS COMORBIDITY AND BODY MASS INDEX (BMI) OF 40.0 TO 44.9 IN ADULT: Primary | Chronic | ICD-10-CM

## 2022-10-25 DIAGNOSIS — E28.2 PCOS (POLYCYSTIC OVARIAN SYNDROME): Chronic | ICD-10-CM

## 2022-10-25 DIAGNOSIS — N97.9 FEMALE INFERTILITY: Chronic | ICD-10-CM

## 2022-10-25 DIAGNOSIS — E16.1 HYPERINSULINEMIA: Chronic | ICD-10-CM

## 2022-10-25 PROCEDURE — 99214 OFFICE O/P EST MOD 30 MIN: CPT | Performed by: NURSE PRACTITIONER

## 2022-10-25 RX ORDER — TIRZEPATIDE 2.5 MG/.5ML
2.5 INJECTION, SOLUTION SUBCUTANEOUS WEEKLY
Qty: 0.5 ML | Refills: 1 | Status: SHIPPED | OUTPATIENT
Start: 2022-10-25 | End: 2022-11-24

## 2022-10-25 NOTE — PROGRESS NOTES
"Answers for HPI/ROS submitted by the patient on 10/20/2022  Please describe your symptoms.: Follow up visit after taking Mounjaro for a few weeks  Have you had these symptoms before?: No  How long have you been having these symptoms?: 1-4 days  What is the primary reason for your visit?: Other    Chief Complaint  Weight Loss (1 month follow up)    Barbi Neal presents to [facility] [place]  History of Present Illness  Patient here today for follow-up on class III severe obesity female infertility hyperinsulinemia and PCOS.  She is tolerating Mounjaro well and has lost 11 pounds since last visit here which was approximately 1 month ago.  She denies side effects of this.  States initially she did have some constipation but not now.  She says that she was able to have.  The first week of October which lasted for about 1 week as well.  Historically her periods have been irregular and she reports that she may go 3 months or longer without having one.  She is decreasing calorie intake, following the Mediterranean diet and increasing exercise.  She reports that she does have a gym where she can work out at work and tries to walk and uses the elliptical as well.    Objective   Vital Signs:  /80   Pulse 73   Temp 98.6 °F (37 °C)   Ht 165.1 cm (65\")   Wt 110 kg (243 lb 3.2 oz)   SpO2 98%   BMI 40.47 kg/m²   Estimated body mass index is 40.47 kg/m² as calculated from the following:    Height as of this encounter: 165.1 cm (65\").    Weight as of this encounter: 110 kg (243 lb 3.2 oz).    Class 3 Severe Obesity (BMI >=40). Obesity-related health conditions include the following: hypertension. Obesity is improving with treatment. BMI is is above average; BMI management plan is completed. We discussed portion control, increasing exercise and pharmacologic options including Mounjaro.      Physical Exam   Result Review :    Common labs    Common " Labs 7/25/22 7/25/22 7/25/22 7/25/22    1203 1203 1203 1203   Glucose  105 (A)     BUN  11     Creatinine  1.08 (A)     Sodium  141     Potassium  4.8     Chloride  108     Calcium  9.6     Albumin  4.30     Total Bilirubin  0.6     Alkaline Phosphatase  61     AST (SGOT)  44 (A)     ALT (SGPT)  63 (A)     WBC 8.09      Hemoglobin 12.9      Hematocrit 39.9      Platelets 321      Total Cholesterol   191    Triglycerides   329 (A)    HDL Cholesterol   37 (A)    LDL Cholesterol    99    Hemoglobin A1C    5.60   (A) Abnormal value            CMP    CMP 7/25/22   Glucose 105 (A)   BUN 11   Creatinine 1.08 (A)   Sodium 141   Potassium 4.8   Chloride 108   Calcium 9.6   Albumin 4.30   Total Bilirubin 0.6   Alkaline Phosphatase 61   AST (SGOT) 44 (A)   ALT (SGPT) 63 (A)   (A) Abnormal value            CBC    CBC 7/25/22   WBC 8.09   RBC 4.75   Hemoglobin 12.9   Hematocrit 39.9   MCV 84.0   MCH 27.2   MCHC 32.3   RDW 14.5   Platelets 321           CBC w/diff    CBC w/Diff 7/25/22   WBC 8.09   RBC 4.75   Hemoglobin 12.9   Hematocrit 39.9   MCV 84.0   MCH 27.2   MCHC 32.3   RDW 14.5   Platelets 321   Neutrophil Rel % 59.2   Lymphocyte Rel % 27.3   Monocyte Rel % 10.3   Eosinophil Rel % 2.7   Basophil Rel % 0.5           Lipid Panel    Lipid Panel 7/25/22   Total Cholesterol 191   Triglycerides 329 (A)   HDL Cholesterol 37 (A)   VLDL Cholesterol 55 (A)   LDL Cholesterol  99   LDL/HDL Ratio 2.38   (A) Abnormal value            TSH    TSH 7/25/22   TSH 3.030                         Assessment and Plan   Diagnoses and all orders for this visit:    1. Class 3 severe obesity due to excess calories with serious comorbidity and body mass index (BMI) of 40.0 to 44.9 in adult (HCC) (Primary)  Comments:  improving  Orders:  -     Tirzepatide (Mounjaro) 2.5 MG/0.5ML solution pen-injector; Inject 2.5 mg under the skin into the appropriate area as directed 1 (One) Time Per Week for 30 days.  Dispense: 0.5 mL; Refill: 1    2. Female  infertility  Comments:  following ANIYAH Howe  Orders:  -     Tirzepatide (Mounjaro) 2.5 MG/0.5ML solution pen-injector; Inject 2.5 mg under the skin into the appropriate area as directed 1 (One) Time Per Week for 30 days.  Dispense: 0.5 mL; Refill: 1    3. Hyperinsulinemia  Comments:  following ANIYAH Howe  Orders:  -     Tirzepatide (Mounjaro) 2.5 MG/0.5ML solution pen-injector; Inject 2.5 mg under the skin into the appropriate area as directed 1 (One) Time Per Week for 30 days.  Dispense: 0.5 mL; Refill: 1    4. PCOS (polycystic ovarian syndrome)  Comments:  following ANIYAH Howe  Orders:  -     Tirzepatide (Mounjaro) 2.5 MG/0.5ML solution pen-injector; Inject 2.5 mg under the skin into the appropriate area as directed 1 (One) Time Per Week for 30 days.  Dispense: 0.5 mL; Refill: 1    Patient doing very well on medication, this is refilled for her as above, she will follow-up in 2 months for recheck here or sooner if needed.  We will follow-up with Lynda regarding further management of infertility hyperinsulinemia and PCOS.  Patient states that she intends to follow-up with Lynda again after her  has had his office visit mid November regarding infertility and repeat sperm count.  She will continue with diet and exercise and labs are reviewed again today.  All questions and concerns addressed with understanding verbalized.  She is aware and is in agreement to this plan.      I spent 33 minutes caring for Gayatri on this date of service. This time includes time spent by me in the following activities:preparing for the visit, reviewing tests, performing a medically appropriate examination and/or evaluation , counseling and educating the patient/family/caregiver, ordering medications, tests, or procedures and documenting information in the medical record  Follow Up   Return in about 2 months (around 12/25/2022), or if symptoms worsen or fail to improve, for Recheck, or  sooner as needed.  Patient was given instructions and counseling regarding her condition or for health maintenance advice. Please see specific information pulled into the AVS if appropriate.

## 2023-01-03 DIAGNOSIS — I10 ESSENTIAL HYPERTENSION: ICD-10-CM

## 2023-01-03 RX ORDER — LABETALOL 200 MG/1
TABLET, FILM COATED ORAL
Qty: 180 TABLET | Refills: 3 | Status: SHIPPED | OUTPATIENT
Start: 2023-01-03

## 2023-01-05 ENCOUNTER — OFFICE VISIT (OUTPATIENT)
Dept: FAMILY MEDICINE CLINIC | Facility: CLINIC | Age: 28
End: 2023-01-05
Payer: COMMERCIAL

## 2023-01-05 VITALS
HEART RATE: 69 BPM | HEIGHT: 65 IN | BODY MASS INDEX: 39.75 KG/M2 | DIASTOLIC BLOOD PRESSURE: 82 MMHG | OXYGEN SATURATION: 99 % | TEMPERATURE: 98.9 F | WEIGHT: 238.6 LBS | SYSTOLIC BLOOD PRESSURE: 130 MMHG

## 2023-01-05 DIAGNOSIS — E16.1 HYPERINSULINEMIA: Chronic | ICD-10-CM

## 2023-01-05 DIAGNOSIS — E28.2 PCOS (POLYCYSTIC OVARIAN SYNDROME): Chronic | ICD-10-CM

## 2023-01-05 DIAGNOSIS — E66.01 CLASS 2 SEVERE OBESITY DUE TO EXCESS CALORIES WITH SERIOUS COMORBIDITY AND BODY MASS INDEX (BMI) OF 39.0 TO 39.9 IN ADULT: Primary | Chronic | ICD-10-CM

## 2023-01-05 DIAGNOSIS — E78.1 HYPERTRIGLYCERIDEMIA: ICD-10-CM

## 2023-01-05 DIAGNOSIS — N97.9 FEMALE INFERTILITY: Chronic | ICD-10-CM

## 2023-01-05 PROCEDURE — 99214 OFFICE O/P EST MOD 30 MIN: CPT | Performed by: NURSE PRACTITIONER

## 2023-01-05 RX ORDER — TIRZEPATIDE 2.5 MG/.5ML
2.5 INJECTION, SOLUTION SUBCUTANEOUS WEEKLY
Qty: 2 ML | Refills: 1 | Status: SHIPPED | OUTPATIENT
Start: 2023-01-05

## 2023-01-05 NOTE — PROGRESS NOTES
Chief Complaint  Weight Check (Med refill)    Subjective        Gayatri Neal presents to [facility] [place]  History of Present Illness     The patient presents today for a recheck on obesity, weight loss, and Mounjaro medications. She has lost an additional 5 pounds since 10/2022 and is down 16 pounds since 09/2022. She reports that she has been taking Mounjaro samples since 10/2022. She adds that she ran out of the Mounjaro on 12/17/2022 and has not had a dose since then and her weight has been fluctuating between 238 and 234 pounds. She adds that she has more energy on the medicine. The patient adds that she had a long menstrual cycle in 12/2022 and she thinks that drained her because she was so tired all last month. The patient adds that she is still following up with ANIYAH Aquino regarding hyperinsulinemia, PCOS, female infertility. She denies any constipation, diarrhea, or nausea.    Objective   Vital Signs:  /82   Pulse 69   Temp 98.9 °F (37.2 °C)   Ht 165.1 cm (65\")   Wt 108 kg (238 lb 9.6 oz)   SpO2 99%   BMI 39.71 kg/m²   Estimated body mass index is 39.71 kg/m² as calculated from the following:    Height as of this encounter: 165.1 cm (65\").    Weight as of this encounter: 108 kg (238 lb 9.6 oz).    Class 2 Severe Obesity (BMI >=35 and <=39.9). Obesity-related health conditions include the following: PCOS, femal infertility, hyperinsulinemia. Obesity is improving with treatment. BMI is is above average; BMI management plan is completed. We discussed portion control, increasing exercise and pharmacologic options including mounjaro.    Physical Exam  Vitals and nursing note reviewed.   Constitutional:       General: She is not in acute distress.     Appearance: Normal appearance. She is obese. She is not ill-appearing, toxic-appearing or diaphoretic.   HENT:      Head: Normocephalic and atraumatic.   Cardiovascular:      Rate  and Rhythm: Normal rate and regular rhythm.      Heart sounds: Normal heart sounds. No murmur heard.    No friction rub. No gallop.   Pulmonary:      Effort: Pulmonary effort is normal. No respiratory distress.      Breath sounds: Normal breath sounds. No stridor. No wheezing, rhonchi or rales.   Abdominal:      Comments: Abdomen is rounded with obesity.   Skin:     General: Skin is warm and dry.      Coloration: Skin is not jaundiced or pale.      Findings: No bruising, erythema, lesion or rash.   Neurological:      Mental Status: She is alert and oriented to person, place, and time.      Cranial Nerves: No cranial nerve deficit.      Motor: No weakness.      Coordination: Coordination normal.      Gait: Gait normal.   Psychiatric:         Mood and Affect: Mood normal.         Behavior: Behavior normal.         Thought Content: Thought content normal.         Judgment: Judgment normal.        Result Review :    Common labs    Common Labs 7/25/22 7/25/22 7/25/22 7/25/22    1203 1203 1203 1203   Glucose  105 (A)     BUN  11     Creatinine  1.08 (A)     Sodium  141     Potassium  4.8     Chloride  108     Calcium  9.6     Albumin  4.30     Total Bilirubin  0.6     Alkaline Phosphatase  61     AST (SGOT)  44 (A)     ALT (SGPT)  63 (A)     WBC 8.09      Hemoglobin 12.9      Hematocrit 39.9      Platelets 321      Total Cholesterol   191    Triglycerides   329 (A)    HDL Cholesterol   37 (A)    LDL Cholesterol    99    Hemoglobin A1C    5.60   (A) Abnormal value            CMP    CMP 7/25/22   Glucose 105 (A)   BUN 11   Creatinine 1.08 (A)   eGFR 72.8   Sodium 141   Potassium 4.8   Chloride 108   Calcium 9.6   Total Protein 7.1   Albumin 4.30   Globulin 2.8   Total Bilirubin 0.6   Alkaline Phosphatase 61   AST (SGOT) 44 (A)   ALT (SGPT) 63 (A)   Albumin/Globulin Ratio 1.5   BUN/Creatinine Ratio 10.2   Anion Gap 9.0   (A) Abnormal value       Comments are available for some flowsheets but are not being displayed.            CBC    CBC 7/25/22   WBC 8.09   RBC 4.75   Hemoglobin 12.9   Hematocrit 39.9   MCV 84.0   MCH 27.2   MCHC 32.3   RDW 14.5   Platelets 321           CBC w/diff    CBC w/Diff 7/25/22   WBC 8.09   RBC 4.75   Hemoglobin 12.9   Hematocrit 39.9   MCV 84.0   MCH 27.2   MCHC 32.3   RDW 14.5   Platelets 321   Neutrophil Rel % 59.2   Lymphocyte Rel % 27.3   Monocyte Rel % 10.3   Eosinophil Rel % 2.7   Basophil Rel % 0.5           Lipid Panel    Lipid Panel 7/25/22   Total Cholesterol 191   Triglycerides 329 (A)   HDL Cholesterol 37 (A)   VLDL Cholesterol 55 (A)   LDL Cholesterol  99   LDL/HDL Ratio 2.38   (A) Abnormal value            TSH    TSH 7/25/22   TSH 3.030           Most Recent A1C    HGBA1C Most Recent 7/25/22   Hemoglobin A1C 5.60                     Assessment and Plan   Diagnoses and all orders for this visit:    1. Class 2 severe obesity due to excess calories with serious comorbidity and body mass index (BMI) of 39.0 to 39.9 in adult (HCC) (Primary)  -     Tirzepatide (Mounjaro) 2.5 MG/0.5ML solution pen-injector; Inject 2.5 mg under the skin into the appropriate area as directed 1 (One) Time Per Week.  Dispense: 2 mL; Refill: 1    2. Female infertility  Comments:  following gyn   Orders:  -     Tirzepatide (Mounjaro) 2.5 MG/0.5ML solution pen-injector; Inject 2.5 mg under the skin into the appropriate area as directed 1 (One) Time Per Week.  Dispense: 2 mL; Refill: 1    3. Hyperinsulinemia  -     Tirzepatide (Mounjaro) 2.5 MG/0.5ML solution pen-injector; Inject 2.5 mg under the skin into the appropriate area as directed 1 (One) Time Per Week.  Dispense: 2 mL; Refill: 1    4. PCOS (polycystic ovarian syndrome)  Comments:  following gyn  Orders:  -     Tirzepatide (Mounjaro) 2.5 MG/0.5ML solution pen-injector; Inject 2.5 mg under the skin into the appropriate area as directed 1 (One) Time Per Week.  Dispense: 2 mL; Refill: 1    5. Hypertriglyceridemia    She is prescribed refills on Mounjaro 2.5 mg weekly.  The dose is not increased because she has been doing very well with weight loss on this dose. I will follow up in 2 months for recheck or sooner if needed. She will continue to follow up with gynecology for female infertility and PCOS. She reports that her menses have been much more regular since starting on Mounjaro and since losing weight. She will continue with low calorie diet and exercise. She will follow up in 2 months for a recheck or sooner if needed. All questions and concerns are addressed with understanding noted. The patient is in agreement to above plan.     I spent 30 minutes caring for Gayatri on this date of service. This time includes time spent by me in the following activities:preparing for the visit, reviewing tests, performing a medically appropriate examination and/or evaluation , counseling and educating the patient/family/caregiver, ordering medications, tests, or procedures and documenting information in the medical record  Follow Up   Return in about 2 months (around 3/5/2023), or if symptoms worsen or fail to improve, for Recheck, or sooner as needed.  Patient was given instructions and counseling regarding her condition or for health maintenance advice. Please see specific information pulled into the AVS if appropriate.     Answers for HPI/ROS submitted by the patient on 1/4/2023  Please describe your symptoms.: Follow up visit for mounjaro  Have you had these symptoms before?: No  How long have you been having these symptoms?: Greater than 2 weeks  What is the primary reason for your visit?: Other    Transcribed from ambient dictation for WAN Ortiz by Reva Hawk.  01/05/23   13:28 CST    Patient or patient representative verbalized consent to the visit recording.  I have personally performed the services described in this document as transcribed by the above individual, and it is both accurate and complete.  WAN Ortiz  1/5/2023  13:55 CST

## 2023-01-17 RX ORDER — METFORMIN HYDROCHLORIDE 500 MG/1
TABLET, EXTENDED RELEASE ORAL
Qty: 180 TABLET | Refills: 3 | Status: SHIPPED | OUTPATIENT
Start: 2023-01-17

## 2023-01-24 ENCOUNTER — OFFICE VISIT (OUTPATIENT)
Dept: OBSTETRICS AND GYNECOLOGY | Facility: CLINIC | Age: 28
End: 2023-01-24
Payer: COMMERCIAL

## 2023-01-24 VITALS
HEIGHT: 65 IN | SYSTOLIC BLOOD PRESSURE: 128 MMHG | WEIGHT: 238 LBS | HEART RATE: 79 BPM | BODY MASS INDEX: 39.65 KG/M2 | DIASTOLIC BLOOD PRESSURE: 90 MMHG

## 2023-01-24 DIAGNOSIS — E28.2 PCOS (POLYCYSTIC OVARIAN SYNDROME): Primary | ICD-10-CM

## 2023-01-24 DIAGNOSIS — Z32.02 PREGNANCY EXAMINATION OR TEST, NEGATIVE RESULT: ICD-10-CM

## 2023-01-24 DIAGNOSIS — N97.0 ANOVULATION: ICD-10-CM

## 2023-01-24 PROCEDURE — 99214 OFFICE O/P EST MOD 30 MIN: CPT | Performed by: NURSE PRACTITIONER

## 2023-01-24 PROCEDURE — 81025 URINE PREGNANCY TEST: CPT | Performed by: NURSE PRACTITIONER

## 2023-01-24 RX ORDER — MEDROXYPROGESTERONE ACETATE 5 MG/1
5 TABLET ORAL DAILY
Qty: 10 TABLET | Refills: 0 | Status: SHIPPED | OUTPATIENT
Start: 2023-01-24 | End: 2023-03-13 | Stop reason: SDUPTHER

## 2023-01-24 NOTE — PROGRESS NOTES
Subjective   Gayatri Neal is a 27 y.o. female.     History of Present Illness   Pt presents ready to pursue fertility measures again now that  has been cleared after varicocele repair. I am awaiting records but he was cleared post op recently and last semen analysis was improving. Reproductive urologist suggests that we move forward with ovulation induction with Gayatri. She has lost 16lb with Monjuaro. She is following a healthy diet and exercising now. BP is better. 128/90 today on lobetalol alone. Has been without her HCTZ a couple of weeks waiting on mail order delivery. She feels her moods are well controlled on Wellbutrin.     Patient's last menstrual period was 12/07/2022. Periods have occurred on their own in Oct, November and December. 10/10, 11/7 and 12/7. The bleeding in December was prolonged, lasting 20 days, not severely heavy but did have some heavy days. No bleeding since. UPT negative.     The following portions of the patient's history were reviewed and updated as appropriate: allergies, current medications, past family history, past medical history, past social history, past surgical history and problem list.    Review of Systems   Constitutional: Negative.  Negative for chills, fever, unexpected weight gain and unexpected weight loss (intentional loss of 16lb).   Respiratory: Negative.    Cardiovascular: Negative.    Genitourinary: Positive for menstrual problem (irregular). Negative for pelvic pain.   Neurological: Negative for dizziness, seizures, syncope and light-headedness.   Psychiatric/Behavioral: Negative.  Negative for depressed mood. The patient is not nervous/anxious.         Well managed on wellbutrin        Objective    Vitals:    01/24/23 1144   BP: 128/90   Pulse:          01/24/23  1108   Weight: 108 kg (238 lb)     Body mass index is 39.61 kg/m².    Physical Exam  Vitals reviewed.   Constitutional:       General: She is not in acute distress.     Appearance:  Normal appearance. She is morbidly obese. She is not ill-appearing.   Cardiovascular:      Rate and Rhythm: Normal rate and regular rhythm.      Heart sounds: Normal heart sounds.   Pulmonary:      Effort: Pulmonary effort is normal.      Breath sounds: Normal breath sounds.   Neurological:      Mental Status: She is alert and oriented to person, place, and time.   Psychiatric:         Mood and Affect: Mood normal.         Behavior: Behavior normal.           Assessment & Plan   Diagnoses and all orders for this visit:    1. PCOS (polycystic ovarian syndrome) (Primary)  -     medroxyPROGESTERone (Provera) 5 MG tablet; Take 1 tablet by mouth Daily.  Dispense: 10 tablet; Refill: 0  -     clomiPHENE (CLOMID) 50 MG tablet; Take 1 tablet PO on cycle day 3 through 7.  Dispense: 5 tablet; Refill: 0    2. Anovulation  -     medroxyPROGESTERone (Provera) 5 MG tablet; Take 1 tablet by mouth Daily.  Dispense: 10 tablet; Refill: 0  -     clomiPHENE (CLOMID) 50 MG tablet; Take 1 tablet PO on cycle day 3 through 7.  Dispense: 5 tablet; Refill: 0        UPT negative today. Begin provera for induction of menses. Take 1 tablet PO daily x 10 days. Bleeding should occur after completion.     First true day that you bleed, start counting as Cycle Day 1.     Begin Clomiphene citrate (Clomid) 50mg Cycle Days 3-7 and Guaifenisen 2Tbsp daily beginning the day after last Clomid dose and taken continually through ovulation.     Begin Ovulation predictor kits 3 days after last Clomid dose, have intercourse every other day beginning 3 days after last Clomid dose (Cycle day 10).  If you use lubricants, look into Pre-Seed Lubricant that should not interfere with sperm motility.    Have Day 21 progesterone lab draw; Urine HCG home tests before next round of Clomid.    Will try 3-6 successful rounds before referral.  Always continue to take Prenatal Vitamins with Folic Acid.    RTC pending results and next cycle PRN.

## 2023-02-27 ENCOUNTER — LAB (OUTPATIENT)
Dept: LAB | Facility: OTHER | Age: 28
End: 2023-02-27
Payer: COMMERCIAL

## 2023-02-27 DIAGNOSIS — E28.2 PCOS (POLYCYSTIC OVARIAN SYNDROME): ICD-10-CM

## 2023-02-27 DIAGNOSIS — E28.2 PCOS (POLYCYSTIC OVARIAN SYNDROME): Primary | ICD-10-CM

## 2023-02-27 PROCEDURE — 84144 ASSAY OF PROGESTERONE: CPT | Performed by: NURSE PRACTITIONER

## 2023-02-27 PROCEDURE — 36415 COLL VENOUS BLD VENIPUNCTURE: CPT | Performed by: NURSE PRACTITIONER

## 2023-02-28 LAB — PROGEST SERPL-MCNC: 0.34 NG/ML

## 2023-03-13 ENCOUNTER — CLINICAL SUPPORT (OUTPATIENT)
Dept: OBSTETRICS AND GYNECOLOGY | Facility: CLINIC | Age: 28
End: 2023-03-13
Payer: COMMERCIAL

## 2023-03-13 DIAGNOSIS — N97.0 ANOVULATION: ICD-10-CM

## 2023-03-13 DIAGNOSIS — E28.2 PCOS (POLYCYSTIC OVARIAN SYNDROME): ICD-10-CM

## 2023-03-13 PROCEDURE — 81025 URINE PREGNANCY TEST: CPT | Performed by: NURSE PRACTITIONER

## 2023-03-13 PROCEDURE — 99211 OFF/OP EST MAY X REQ PHY/QHP: CPT | Performed by: NURSE PRACTITIONER

## 2023-03-13 RX ORDER — MEDROXYPROGESTERONE ACETATE 5 MG/1
5 TABLET ORAL DAILY
Qty: 10 TABLET | Refills: 0 | Status: SHIPPED | OUTPATIENT
Start: 2023-03-13 | End: 2023-03-23

## 2023-03-13 NOTE — PROGRESS NOTES
LMP 2/5. Progesterone 0.34. UPT neg.  Repeat provera and clomid. If not successful, we will get [facility] and refer to reproductive endocrinology.

## 2023-04-03 DIAGNOSIS — I10 ESSENTIAL HYPERTENSION: ICD-10-CM

## 2023-04-03 RX ORDER — HYDROCHLOROTHIAZIDE 12.5 MG/1
TABLET ORAL
Qty: 90 TABLET | Refills: 3 | Status: SHIPPED | OUTPATIENT
Start: 2023-04-03

## 2023-05-16 ENCOUNTER — OFFICE VISIT (OUTPATIENT)
Dept: FAMILY MEDICINE CLINIC | Facility: CLINIC | Age: 28
End: 2023-05-16
Payer: COMMERCIAL

## 2023-05-16 ENCOUNTER — LAB (OUTPATIENT)
Dept: LAB | Facility: OTHER | Age: 28
End: 2023-05-16
Payer: COMMERCIAL

## 2023-05-16 VITALS
SYSTOLIC BLOOD PRESSURE: 130 MMHG | DIASTOLIC BLOOD PRESSURE: 88 MMHG | HEART RATE: 72 BPM | BODY MASS INDEX: 39.95 KG/M2 | OXYGEN SATURATION: 98 % | HEIGHT: 65 IN | WEIGHT: 239.8 LBS | TEMPERATURE: 98 F

## 2023-05-16 DIAGNOSIS — L30.9 DERMATITIS: ICD-10-CM

## 2023-05-16 DIAGNOSIS — Z00.00 ROUTINE MEDICAL EXAM: ICD-10-CM

## 2023-05-16 DIAGNOSIS — E66.01 CLASS 2 SEVERE OBESITY DUE TO EXCESS CALORIES WITH SERIOUS COMORBIDITY AND BODY MASS INDEX (BMI) OF 39.0 TO 39.9 IN ADULT: ICD-10-CM

## 2023-05-16 DIAGNOSIS — Z23 NEED FOR TDAP VACCINATION: ICD-10-CM

## 2023-05-16 DIAGNOSIS — Z00.00 ROUTINE MEDICAL EXAM: Primary | ICD-10-CM

## 2023-05-16 LAB
ALBUMIN SERPL-MCNC: 4.3 G/DL (ref 3.5–5)
ALBUMIN/GLOB SERPL: 1.4 G/DL (ref 1.1–1.8)
ALP SERPL-CCNC: 52 U/L (ref 38–126)
ALT SERPL W P-5'-P-CCNC: 38 U/L
ANION GAP SERPL CALCULATED.3IONS-SCNC: 12 MMOL/L (ref 5–15)
AST SERPL-CCNC: 39 U/L (ref 14–36)
BASOPHILS # BLD AUTO: 0.08 10*3/MM3 (ref 0–0.2)
BASOPHILS NFR BLD AUTO: 1 % (ref 0–1.5)
BILIRUB SERPL-MCNC: 0.7 MG/DL (ref 0.2–1.3)
BUN SERPL-MCNC: 11 MG/DL (ref 7–23)
BUN/CREAT SERPL: 9.2 (ref 7–25)
CALCIUM SPEC-SCNC: 9.2 MG/DL (ref 8.4–10.2)
CHLORIDE SERPL-SCNC: 103 MMOL/L (ref 101–112)
CHOLEST SERPL-MCNC: 166 MG/DL (ref 150–200)
CO2 SERPL-SCNC: 24 MMOL/L (ref 22–30)
CREAT SERPL-MCNC: 1.19 MG/DL (ref 0.52–1.04)
DEPRECATED RDW RBC AUTO: 47.8 FL (ref 37–54)
EGFRCR SERPLBLD CKD-EPI 2021: 64.4 ML/MIN/1.73
EOSINOPHIL # BLD AUTO: 0.26 10*3/MM3 (ref 0–0.4)
EOSINOPHIL NFR BLD AUTO: 3.3 % (ref 0.3–6.2)
ERYTHROCYTE [DISTWIDTH] IN BLOOD BY AUTOMATED COUNT: 16.2 % (ref 12.3–15.4)
GLOBULIN UR ELPH-MCNC: 3.1 GM/DL (ref 2.3–3.5)
GLUCOSE SERPL-MCNC: 88 MG/DL (ref 70–99)
HCT VFR BLD AUTO: 35.8 % (ref 34–46.6)
HDLC SERPL-MCNC: 30 MG/DL (ref 40–59)
HGB BLD-MCNC: 11.6 G/DL (ref 12–15.9)
LDLC SERPL CALC-MCNC: 105 MG/DL
LDLC/HDLC SERPL: 3.38 {RATIO} (ref 0–3.22)
LYMPHOCYTES # BLD AUTO: 2.25 10*3/MM3 (ref 0.7–3.1)
LYMPHOCYTES NFR BLD AUTO: 28.6 % (ref 19.6–45.3)
MCH RBC QN AUTO: 26.7 PG (ref 26.6–33)
MCHC RBC AUTO-ENTMCNC: 32.4 G/DL (ref 31.5–35.7)
MCV RBC AUTO: 82.3 FL (ref 79–97)
MONOCYTES # BLD AUTO: 0.68 10*3/MM3 (ref 0.1–0.9)
MONOCYTES NFR BLD AUTO: 8.7 % (ref 5–12)
NEUTROPHILS NFR BLD AUTO: 4.59 10*3/MM3 (ref 1.7–7)
NEUTROPHILS NFR BLD AUTO: 58.4 % (ref 42.7–76)
PLATELET # BLD AUTO: 353 10*3/MM3 (ref 140–450)
PMV BLD AUTO: 10.1 FL (ref 6–12)
POTASSIUM SERPL-SCNC: 4 MMOL/L (ref 3.4–5)
PROT SERPL-MCNC: 7.4 G/DL (ref 6.3–8.6)
RBC # BLD AUTO: 4.35 10*6/MM3 (ref 3.77–5.28)
SODIUM SERPL-SCNC: 139 MMOL/L (ref 137–145)
TRIGL SERPL-MCNC: 173 MG/DL
VLDLC SERPL-MCNC: 31 MG/DL (ref 5–40)
WBC NRBC COR # BLD: 7.86 10*3/MM3 (ref 3.4–10.8)

## 2023-05-16 PROCEDURE — 80061 LIPID PANEL: CPT | Performed by: NURSE PRACTITIONER

## 2023-05-16 PROCEDURE — 36415 COLL VENOUS BLD VENIPUNCTURE: CPT | Performed by: NURSE PRACTITIONER

## 2023-05-16 PROCEDURE — 80050 GENERAL HEALTH PANEL: CPT | Performed by: NURSE PRACTITIONER

## 2023-05-16 PROCEDURE — 83036 HEMOGLOBIN GLYCOSYLATED A1C: CPT | Performed by: NURSE PRACTITIONER

## 2023-05-16 RX ORDER — METHYLPREDNISOLONE 4 MG/1
TABLET ORAL
Qty: 1 EACH | Refills: 0 | Status: SHIPPED | OUTPATIENT
Start: 2023-05-16

## 2023-05-16 NOTE — PROGRESS NOTES
"Chief Complaint  Annual Exam (Biometric screening/) and Rash (Rash on both arms, does not itch.)    Subjective        Gayatri Neal presents to [facility] [place]  History of Present Illness  Pt here today for wellness exam needed for work with labs, states she continues to see Lynda DILL and is starting clomid in near future for infertility . Complaining of non itching rash on BUE which started about 2 weeks ago. She has used a steroid cream her mother gave her as well as an eczema lotion OTC without any relief of symptoms. Would like for us to fax copy of her paperwork to number on paper after lab results are obtained.   Objective   Vital Signs:  /88   Pulse 72   Temp 98 °F (36.7 °C)   Ht 165.1 cm (65\")   Wt 109 kg (239 lb 12.8 oz)   SpO2 98%   BMI 39.90 kg/m²   Estimated body mass index is 39.9 kg/m² as calculated from the following:    Height as of this encounter: 165.1 cm (65\").    Weight as of this encounter: 109 kg (239 lb 12.8 oz).     Class 2 Severe Obesity (BMI >=35 and <=39.9). Obesity-related health conditions include the following: hypertension. Obesity is unchanged. BMI is is above average; BMI management plan is completed. We discussed portion control and increasing exercise.    Physical Exam  Vitals and nursing note reviewed.   Constitutional:       General: She is not in acute distress.     Appearance: Normal appearance. She is obese. She is not ill-appearing, toxic-appearing or diaphoretic.   HENT:      Head: Normocephalic and atraumatic.      Right Ear: Tympanic membrane, ear canal and external ear normal. There is no impacted cerumen.      Left Ear: Tympanic membrane, ear canal and external ear normal. There is no impacted cerumen.   Eyes:      General:         Right eye: No discharge.         Left eye: No discharge.      Extraocular Movements: Extraocular movements intact.      Conjunctiva/sclera: Conjunctivae " normal.      Pupils: Pupils are equal, round, and reactive to light.   Neck:      Vascular: No carotid bruit.   Cardiovascular:      Rate and Rhythm: Normal rate and regular rhythm.      Pulses: Normal pulses.      Heart sounds: Normal heart sounds. No murmur heard.    No friction rub. No gallop.   Pulmonary:      Effort: Pulmonary effort is normal. No respiratory distress.      Breath sounds: Normal breath sounds. No stridor. No wheezing, rhonchi or rales.   Abdominal:      Comments: abd rounded with obesity   Musculoskeletal:      Cervical back: Neck supple.      Right lower leg: No edema.      Left lower leg: No edema.   Skin:     General: Skin is warm and dry.      Coloration: Skin is not jaundiced or pale.      Findings: Erythema and rash present. No bruising or lesion. Rash is macular and papular. Rash is not pustular.          Neurological:      Mental Status: She is alert and oriented to person, place, and time.      Cranial Nerves: No cranial nerve deficit.      Motor: No weakness.      Coordination: Coordination normal.      Gait: Gait normal.   Psychiatric:         Mood and Affect: Mood normal.         Behavior: Behavior normal.         Thought Content: Thought content normal.         Judgment: Judgment normal.        Result Review :    CMP        7/25/2022    12:03   CMP   Glucose 105     BUN 11     Creatinine 1.08     EGFR 72.8     Sodium 141     Potassium 4.8     Chloride 108     Calcium 9.6     Total Protein 7.1     Albumin 4.30     Globulin 2.8     Total Bilirubin 0.6     Alkaline Phosphatase 61     AST (SGOT) 44     ALT (SGPT) 63     Albumin/Globulin Ratio 1.5     BUN/Creatinine Ratio 10.2     Anion Gap 9.0       CBC        7/25/2022    12:03   CBC   WBC 8.09     RBC 4.75     Hemoglobin 12.9     Hematocrit 39.9     MCV 84.0     MCH 27.2     MCHC 32.3     RDW 14.5     Platelets 321       CBC w/diff        7/25/2022    12:03   CBC w/Diff   WBC 8.09     RBC 4.75     Hemoglobin 12.9     Hematocrit 39.9      MCV 84.0     MCH 27.2     MCHC 32.3     RDW 14.5     Platelets 321     Neutrophil Rel % 59.2     Lymphocyte Rel % 27.3     Monocyte Rel % 10.3     Eosinophil Rel % 2.7     Basophil Rel % 0.5       Lipid Panel        7/25/2022    12:03   Lipid Panel   Total Cholesterol 191     Triglycerides 329     HDL Cholesterol 37     VLDL Cholesterol 55     LDL Cholesterol  99     LDL/HDL Ratio 2.38       TSH        7/25/2022    12:03   TSH   TSH 3.030                    Assessment and Plan   Diagnoses and all orders for this visit:    1. Routine medical exam (Primary)  -     Lipid panel; Future  -     Comprehensive metabolic panel; Future  -     Hemoglobin A1c; Future  -     CBC & Differential; Future  -     TSH; Future    2. Need for Tdap vaccination  -     Tdap Vaccine Greater Than or Equal To 6yo IM    3. Dermatitis  -     methylPREDNISolone (MEDROL) 4 MG dose pack; Take as directed on package instructions.  Dispense: 1 each; Refill: 0    4. Class 2 severe obesity due to excess calories with serious comorbidity and body mass index (BMI) of 39.0 to 39.9 in adult    Labs will be obtained as above today since she is fasting and paperwork will be completed, can fax paperwork to [phone] once results return per patient request.  She is given tetanus in office today, tolerated well.  She is prescribed Medrol Dosepak as above start as soon as she can so that she can complete this by the time she starts infertility treatments.  Patient aware and in agreement to this plan.  We will follow-up here as needed problems or if her rash persists or worsens.  All questions and concerns addressed with understanding verbalized.     I spent 25 minutes caring for Gayatri on this date of service. This time includes time spent by me in the following activities:preparing for the visit, reviewing tests, performing a medically appropriate examination and/or evaluation , counseling and educating the patient/family/caregiver, ordering medications,  tests, or procedures and documenting information in the medical record  Follow Up   Return if symptoms worsen or fail to improve, for Recheck, or sooner as needed.  Patient was given instructions and counseling regarding her condition or for health maintenance advice. Please see specific information pulled into the AVS if appropriate.

## 2023-05-17 LAB
HBA1C MFR BLD: 5.8 % (ref 4.8–5.6)
TSH SERPL DL<=0.05 MIU/L-ACNC: 1.78 UIU/ML (ref 0.27–4.2)

## 2023-06-06 DIAGNOSIS — E28.2 PCOS (POLYCYSTIC OVARIAN SYNDROME): ICD-10-CM

## 2023-06-06 DIAGNOSIS — N97.0 ANOVULATION: Primary | ICD-10-CM

## 2023-06-07 ENCOUNTER — LAB (OUTPATIENT)
Dept: LAB | Facility: OTHER | Age: 28
End: 2023-06-07
Payer: COMMERCIAL

## 2023-06-07 LAB — PROGEST SERPL-MCNC: 0.26 NG/ML

## 2023-06-07 PROCEDURE — 84144 ASSAY OF PROGESTERONE: CPT | Performed by: NURSE PRACTITIONER

## 2023-06-07 PROCEDURE — 36415 COLL VENOUS BLD VENIPUNCTURE: CPT | Performed by: NURSE PRACTITIONER

## 2023-07-28 DIAGNOSIS — N97.0 ANOVULATION: Primary | ICD-10-CM

## 2023-07-31 ENCOUNTER — LAB (OUTPATIENT)
Dept: LAB | Facility: OTHER | Age: 28
End: 2023-07-31
Payer: COMMERCIAL

## 2023-07-31 LAB — PROGEST SERPL-MCNC: 2.98 NG/ML

## 2023-07-31 PROCEDURE — 84144 ASSAY OF PROGESTERONE: CPT | Performed by: NURSE PRACTITIONER

## 2023-08-14 ENCOUNTER — DOCUMENTATION (OUTPATIENT)
Dept: OBSTETRICS AND GYNECOLOGY | Facility: CLINIC | Age: 28
End: 2023-08-14
Payer: COMMERCIAL

## 2023-08-14 NOTE — PROGRESS NOTES
Pt had period on 8/9-8/11 on her own. Last cycle progesterone was 2.98. Is considering reproductive endocrinology but will let me know when. She is changing jobs and insurance will lapse for a short time.

## 2023-09-08 DIAGNOSIS — F41.9 ANXIETY AND DEPRESSION: ICD-10-CM

## 2023-09-08 DIAGNOSIS — F32.A ANXIETY AND DEPRESSION: ICD-10-CM

## 2023-09-08 RX ORDER — BUPROPION HYDROCHLORIDE 150 MG/1
TABLET ORAL
Qty: 90 TABLET | Refills: 1 | Status: SHIPPED | OUTPATIENT
Start: 2023-09-08

## 2023-09-19 DIAGNOSIS — Z31.9 INFERTILITY MANAGEMENT: ICD-10-CM

## 2023-09-19 DIAGNOSIS — E28.2 PCOS (POLYCYSTIC OVARIAN SYNDROME): Primary | ICD-10-CM
